# Patient Record
Sex: FEMALE | Race: WHITE | NOT HISPANIC OR LATINO | Employment: OTHER | URBAN - METROPOLITAN AREA
[De-identification: names, ages, dates, MRNs, and addresses within clinical notes are randomized per-mention and may not be internally consistent; named-entity substitution may affect disease eponyms.]

---

## 2023-05-30 RX ORDER — METHOCARBAMOL 750 MG/1
750 TABLET, FILM COATED ORAL 3 TIMES DAILY PRN
COMMUNITY
Start: 2023-03-19 | End: 2023-07-20 | Stop reason: SDUPTHER

## 2023-05-30 RX ORDER — OMEPRAZOLE 40 MG/1
40 CAPSULE, DELAYED RELEASE ORAL DAILY
COMMUNITY
Start: 2022-12-07 | End: 2023-07-20 | Stop reason: ALTCHOICE

## 2023-05-30 RX ORDER — TRAMADOL HYDROCHLORIDE 50 MG/1
50 TABLET ORAL EVERY 6 HOURS PRN
COMMUNITY
End: 2023-07-20 | Stop reason: SDUPTHER

## 2023-05-30 RX ORDER — TRAMADOL HYDROCHLORIDE 50 MG/1
50 TABLET ORAL EVERY 6 HOURS PRN
Qty: 10 TABLET | OUTPATIENT
Start: 2023-05-30

## 2023-05-30 RX ORDER — VALACYCLOVIR HYDROCHLORIDE 500 MG/1
500 TABLET, FILM COATED ORAL DAILY
COMMUNITY
Start: 2023-03-02 | End: 2023-07-20 | Stop reason: SDUPTHER

## 2023-05-30 RX ORDER — SERTRALINE HYDROCHLORIDE 50 MG/1
50 TABLET, FILM COATED ORAL DAILY
COMMUNITY
End: 2023-07-20 | Stop reason: SDUPTHER

## 2023-05-30 NOTE — TELEPHONE ENCOUNTER
Medication refused due to failing protocol.    Requested Prescriptions   Pending Prescriptions Disp Refills    traMADol (Ultram) 50 mg tablet 10 tablet      Sig: Take 1 tablet (50 mg) by mouth every 6 hours if needed.       Opioid Analgesics Protocol Failed - 5/30/2023  7:45 AM        Failed - Visit with relevant provider in past 3 months     Recent Visits  No visits were found meeting these conditions.  Showing recent visits within past 90 days and meeting all other requirements  Future Appointments  No visits were found meeting these conditions.  Showing future appointments within next 0 days and meeting all other requirements              Failed - No matching opioid in the past 29 days        Failed - Opioid pain agreement on file in past year        Failed - Urine or saliva toxicology result on file in past 12 months        Passed - No active pregnancy on record        Passed - No pregnancy test in the past 12 months or most recent test was negative        Passed - No Benzodiazepines on active med list       Controlled Substance Protocol Failed - 5/30/2023  7:45 AM        Failed - Urine or saliva toxicology result on file in past 12 months        Failed - Medication not refilled in  past 29 days        Failed - Visit with relevant provider in past 12 months.        Passed - No active pregnancy on record        Passed - No pregnancy test in the past 12 months or most recent test was negative         Signed Prescriptions Disp Refills    traMADol (Ultram) 50 mg tablet       Sig: Take 1 tablet (50 mg) by mouth every 6 hours if needed.       There is no refill protocol information for this order       valACYclovir (Valtrex) 500 mg tablet       Sig: Take 1 tablet (500 mg) by mouth once daily.       There is no refill protocol information for this order       sertraline (Zoloft) 50 mg tablet       Sig: Take 1 tablet (50 mg) by mouth once daily.       There is no refill protocol information for this order        omeprazole (PriLOSEC) 40 mg DR capsule       Sig: Take 1 capsule (40 mg) by mouth once daily.       There is no refill protocol information for this order       methocarbamol (Robaxin) 750 mg tablet       Sig: Take 1 tablet (750 mg) by mouth 3 times a day as needed.       There is no refill protocol information for this order

## 2023-06-20 LAB
ALANINE AMINOTRANSFERASE (SGPT) (U/L) IN SER/PLAS: 12 U/L (ref 7–45)
ASPARTATE AMINOTRANSFERASE (SGOT) (U/L) IN SER/PLAS: 16 U/L (ref 9–39)
CHOLESTEROL (MG/DL) IN SER/PLAS: 236 MG/DL (ref 0–199)
CHOLESTEROL IN HDL (MG/DL) IN SER/PLAS: 74.3 MG/DL
CHOLESTEROL/HDL RATIO: 3.2
LDL: 146 MG/DL (ref 0–99)
TRIGLYCERIDE (MG/DL) IN SER/PLAS: 77 MG/DL (ref 0–149)
VLDL: 15 MG/DL (ref 0–40)

## 2023-07-18 ENCOUNTER — APPOINTMENT (OUTPATIENT)
Dept: PRIMARY CARE | Facility: CLINIC | Age: 68
End: 2023-07-18
Payer: MEDICARE

## 2023-07-19 NOTE — PROGRESS NOTES
Subjective   Patient ID: Ambar Marsh is a 67 y.o. female who presents for Annual Exam (Medicare wellness check up) and Med Refill.  Med Refill  Associated symptoms include arthralgias, myalgias and neck pain.     Co pain  in the legs and back  sciatica  radiation.   Tramdol helps.   She wants to see pain management    Review lab.   Below is the patient's most recent value for Albumin, ALT, AST, BUN, Calcium, Chloride, Cholesterol, CO2, Creatinine, GFR, Glucose, HDL, Hematocrit, Hemoglobin, Hemoglobin A1C, LDL, Magnesium, Phosphorus, Platelets, Potassium, PSA, Sodium, Triglycerides, and WBC.   Lab Results   Component Value Date    ALBUMIN 4.9 11/17/2022    ALT 12 06/20/2023    AST 16 06/20/2023    BUN 18 11/17/2022    CALCIUM 9.9 11/17/2022     11/17/2022    CHOL 236 (H) 06/20/2023    CO2 29 11/17/2022    CREATININE 0.69 11/17/2022    HDL 74.3 06/20/2023    HCT 42.2 11/17/2022    HGB 13.9 11/17/2022    HGBA1C 5.6 08/25/2020     11/17/2022    K 4.6 11/17/2022     11/17/2022    TRIG 77 06/20/2023    WBC 11.0 11/17/2022     Note: for a comprehensive list of the patient's lab results, access the Results Review activity.    Review of Systems   Constitutional: Negative.    HENT: Negative.     Eyes: Negative.    Respiratory: Negative.     Cardiovascular: Negative.    Gastrointestinal: Negative.    Endocrine: Negative.    Musculoskeletal:  Positive for arthralgias, myalgias and neck pain.   Skin: Negative.    Neurological: Negative.    Hematological: Negative.    Psychiatric/Behavioral: Negative.     All other systems reviewed and are negative.      Objective   Physical Exam  Vitals and nursing note reviewed. Exam conducted with a chaperone present.   Constitutional:       Appearance: Normal appearance.   HENT:      Head: Normocephalic and atraumatic.      Right Ear: Tympanic membrane, ear canal and external ear normal.      Left Ear: Tympanic membrane, ear canal and external ear normal.      Nose:  Nose normal.      Mouth/Throat:      Mouth: Mucous membranes are moist.      Pharynx: Oropharynx is clear.   Eyes:      Extraocular Movements: Extraocular movements intact.      Conjunctiva/sclera: Conjunctivae normal.      Pupils: Pupils are equal, round, and reactive to light.   Cardiovascular:      Rate and Rhythm: Normal rate and regular rhythm.      Pulses: Normal pulses.      Heart sounds: Normal heart sounds.   Pulmonary:      Effort: Pulmonary effort is normal.      Breath sounds: Normal breath sounds.   Musculoskeletal:         General: Tenderness present. Normal range of motion.      Cervical back: Neck supple.   Skin:     General: Skin is warm and dry.      Capillary Refill: Capillary refill takes less than 2 seconds.   Neurological:      General: No focal deficit present.      Mental Status: She is alert and oriented to person, place, and time. Mental status is at baseline.   Psychiatric:         Mood and Affect: Mood normal.         Behavior: Behavior normal.         Thought Content: Thought content normal.         Judgment: Judgment normal.         Assessment/Plan   Problem List Items Addressed This Visit          Medium    Chronic bronchitis, unspecified chronic bronchitis type (CMS/HCC)     Other Visit Diagnoses       Healthcare maintenance    -  Primary    Relevant Medications    sertraline (Zoloft) 50 mg tablet    valACYclovir (Valtrex) 500 mg tablet    Other Relevant Orders    CBC    Comprehensive Metabolic Panel    TSH with reflex to Free T4 if abnormal    Encounter for screening mammogram for breast cancer        Screening for colorectal cancer        Routine general medical examination at health care facility        Encounter for screening mammogram for malignant neoplasm of breast        Benign essential HTN        Relevant Orders    CBC    Comprehensive Metabolic Panel    TSH with reflex to Free T4 if abnormal    Hyperlipidemia, unspecified hyperlipidemia type        Sciatic leg pain         Relevant Medications    methocarbamol (Robaxin) 750 mg tablet    traMADol (Ultram) 50 mg tablet    Other Relevant Orders    Referral to Pain Medicine                 OARRS:  Bernie Anne MD on 7/20/2023  3:13 PM  I have personally reviewed the OARRS report for Ambar Marsh. I have considered the risks of abuse, dependence, addiction and diversion    Is the patient prescribed a combination of a benzodiazepine and opioid?  No    Last Urine Drug Screen / ordered today: no  Recent Results (from the past 23239 hour(s))   OPIATE/OPIOID/BENZO PRESCRIPTION COMPLIANCE    Collection Time: 11/17/22  3:29 PM   Result Value Ref Range    DRUG SCREEN COMMENT URINE SEE BELOW     Creatine, Urine 174.8 mg/dL    Amphetamine Screen, Urine PRESUMPTIVE NEGATIVE NEGATIVE    Barbiturate Screen, Urine PRESUMPTIVE NEGATIVE NEGATIVE    Cannabinoid Screen, Urine PRESUMPTIVE NEGATIVE NEGATIVE    Cocaine Screen, Urine PRESUMPTIVE NEGATIVE NEGATIVE    PCP Screen, Urine PRESUMPTIVE NEGATIVE NEGATIVE    7-Aminoclonazepam <25 Cutoff <25 ng/mL    Alpha-Hydroxyalprazolam <25 Cutoff <25 ng/mL    Alpha-Hydroxymidazolam <25 Cutoff <25 ng/mL    Alprazolam <25 Cutoff <25 ng/mL    Chlordiazepoxide <25 Cutoff <25 ng/mL    Clonazepam <25 Cutoff <25 ng/mL    Diazepam <25 Cutoff <25 ng/mL    Lorazepam <25 Cutoff <25 ng/mL    Midazolam <25 Cutoff <25 ng/mL    Nordiazepam <25 Cutoff <25 ng/mL    Oxazepam <25 Cutoff <25 ng/mL    Temazepam <25 Cutoff <25 ng/mL    Zolpidem <25 Cutoff <25 ng/mL    Zolpidem Metabolite (ZCA) <25 Cutoff <25 ng/mL    6-Acetylmorphine <25 Cutoff <25 ng/mL    Codeine <50 Cutoff <50 ng/mL    Hydrocodone <25 Cutoff <25 ng/mL    Hydromorphone <25 Cutoff <25 ng/mL    Morphine Urine <50 Cutoff <50 ng/mL    Norhydrocodone <25 Cutoff <25 ng/mL    Noroxycodone <25 Cutoff <25 ng/mL    Oxycodone <25 Cutoff <25 ng/mL    Oxymorphone <25 Cutoff <25 ng/mL    Tramadol >1000 (A) Cutoff <50 ng/mL    O-Desmethyltramadol >1000 (A) Cutoff <50 ng/mL     Fentanyl <2.5 Cutoff<2.5 ng/mL    Norfentanyl <2.5 Cutoff<2.5 ng/mL    METHADONE CONFIRMATION,URINE <25 Cutoff <25 ng/mL    EDDP <25 Cutoff <25 ng/mL     Results are as expected.     Controlled Substance Agreement:  Date of the Last Agreement: 11/8/ 22  Reviewed Controlled Substance Agreement including but not limited to the benefits, risks, and alternatives to treatment with a Controlled Substance medication(s).    Opioids:  What is the patient's goal of therapy? Keep pain  4 or  below / 10   Is this being achieved with current treatment?y Es      I have calculated the patient's Morphine Dose Equivalent (MED):   I have considered referral to Pain Management and/or a specialist, and do not feel it is necessary at this time.    I feel that it is clinically indicated to continue this current medication regimen after consideration of alternative therapies, and other non-opioid treatment.    Opioid Risk Screening:  No data recorded    Pain Assessment:  No data recorded

## 2023-07-20 ENCOUNTER — OFFICE VISIT (OUTPATIENT)
Dept: PRIMARY CARE | Facility: CLINIC | Age: 68
End: 2023-07-20
Payer: MEDICARE

## 2023-07-20 VITALS
OXYGEN SATURATION: 94 % | SYSTOLIC BLOOD PRESSURE: 129 MMHG | WEIGHT: 136.6 LBS | DIASTOLIC BLOOD PRESSURE: 73 MMHG | BODY MASS INDEX: 23.32 KG/M2 | HEIGHT: 64 IN | HEART RATE: 64 BPM

## 2023-07-20 DIAGNOSIS — Z12.31 ENCOUNTER FOR SCREENING MAMMOGRAM FOR BREAST CANCER: ICD-10-CM

## 2023-07-20 DIAGNOSIS — Z00.00 HEALTHCARE MAINTENANCE: Primary | ICD-10-CM

## 2023-07-20 DIAGNOSIS — Z00.00 ROUTINE GENERAL MEDICAL EXAMINATION AT HEALTH CARE FACILITY: ICD-10-CM

## 2023-07-20 DIAGNOSIS — J42 CHRONIC BRONCHITIS, UNSPECIFIED CHRONIC BRONCHITIS TYPE (MULTI): ICD-10-CM

## 2023-07-20 DIAGNOSIS — I10 BENIGN ESSENTIAL HTN: ICD-10-CM

## 2023-07-20 DIAGNOSIS — Z12.11 SCREENING FOR COLORECTAL CANCER: ICD-10-CM

## 2023-07-20 DIAGNOSIS — M54.30 SCIATIC LEG PAIN: ICD-10-CM

## 2023-07-20 DIAGNOSIS — Z12.12 SCREENING FOR COLORECTAL CANCER: ICD-10-CM

## 2023-07-20 DIAGNOSIS — Z12.31 ENCOUNTER FOR SCREENING MAMMOGRAM FOR MALIGNANT NEOPLASM OF BREAST: ICD-10-CM

## 2023-07-20 DIAGNOSIS — E78.5 HYPERLIPIDEMIA, UNSPECIFIED HYPERLIPIDEMIA TYPE: ICD-10-CM

## 2023-07-20 PROCEDURE — 1036F TOBACCO NON-USER: CPT | Performed by: INTERNAL MEDICINE

## 2023-07-20 PROCEDURE — 1126F AMNT PAIN NOTED NONE PRSNT: CPT | Performed by: INTERNAL MEDICINE

## 2023-07-20 PROCEDURE — 3074F SYST BP LT 130 MM HG: CPT | Performed by: INTERNAL MEDICINE

## 2023-07-20 PROCEDURE — 3078F DIAST BP <80 MM HG: CPT | Performed by: INTERNAL MEDICINE

## 2023-07-20 PROCEDURE — 99397 PER PM REEVAL EST PAT 65+ YR: CPT | Performed by: INTERNAL MEDICINE

## 2023-07-20 PROCEDURE — 1170F FXNL STATUS ASSESSED: CPT | Performed by: INTERNAL MEDICINE

## 2023-07-20 PROCEDURE — 1159F MED LIST DOCD IN RCRD: CPT | Performed by: INTERNAL MEDICINE

## 2023-07-20 PROCEDURE — 1160F RVW MEDS BY RX/DR IN RCRD: CPT | Performed by: INTERNAL MEDICINE

## 2023-07-20 PROCEDURE — G0439 PPPS, SUBSEQ VISIT: HCPCS | Performed by: INTERNAL MEDICINE

## 2023-07-20 RX ORDER — TRAMADOL HYDROCHLORIDE 50 MG/1
50 TABLET ORAL EVERY 6 HOURS PRN
Qty: 120 TABLET | Refills: 2 | Status: SHIPPED | OUTPATIENT
Start: 2023-07-20 | End: 2023-10-24 | Stop reason: SDUPTHER

## 2023-07-20 RX ORDER — METHOCARBAMOL 750 MG/1
750 TABLET, FILM COATED ORAL 3 TIMES DAILY PRN
Qty: 90 TABLET | Refills: 5 | Status: SHIPPED | OUTPATIENT
Start: 2023-07-20 | End: 2024-04-08 | Stop reason: SDUPTHER

## 2023-07-20 RX ORDER — VALACYCLOVIR HYDROCHLORIDE 500 MG/1
500 TABLET, FILM COATED ORAL DAILY
Qty: 90 TABLET | Refills: 3 | Status: SHIPPED | OUTPATIENT
Start: 2023-07-20 | End: 2024-04-08 | Stop reason: SDUPTHER

## 2023-07-20 RX ORDER — SERTRALINE HYDROCHLORIDE 50 MG/1
50 TABLET, FILM COATED ORAL DAILY
Qty: 90 TABLET | Refills: 3 | Status: SHIPPED | OUTPATIENT
Start: 2023-07-20 | End: 2024-01-23 | Stop reason: SDUPTHER

## 2023-07-20 ASSESSMENT — ENCOUNTER SYMPTOMS
RESPIRATORY NEGATIVE: 1
EYES NEGATIVE: 1
CONSTITUTIONAL NEGATIVE: 1
HEMATOLOGIC/LYMPHATIC NEGATIVE: 1
ARTHRALGIAS: 1
PSYCHIATRIC NEGATIVE: 1
LOSS OF SENSATION IN FEET: 0
OCCASIONAL FEELINGS OF UNSTEADINESS: 0
GASTROINTESTINAL NEGATIVE: 1
NECK PAIN: 1
CARDIOVASCULAR NEGATIVE: 1
NEUROLOGICAL NEGATIVE: 1
ENDOCRINE NEGATIVE: 1
MYALGIAS: 1
DEPRESSION: 0

## 2023-07-20 ASSESSMENT — LIFESTYLE VARIABLES
AUDIT-C TOTAL SCORE: 2
HOW OFTEN DO YOU HAVE SIX OR MORE DRINKS ON ONE OCCASION: LESS THAN MONTHLY
SKIP TO QUESTIONS 9-10: 0
HOW MANY STANDARD DRINKS CONTAINING ALCOHOL DO YOU HAVE ON A TYPICAL DAY: 1 OR 2
HOW OFTEN DO YOU HAVE A DRINK CONTAINING ALCOHOL: MONTHLY OR LESS

## 2023-07-20 ASSESSMENT — PATIENT HEALTH QUESTIONNAIRE - PHQ9
1. LITTLE INTEREST OR PLEASURE IN DOING THINGS: NOT AT ALL
2. FEELING DOWN, DEPRESSED OR HOPELESS: NOT AT ALL
SUM OF ALL RESPONSES TO PHQ9 QUESTIONS 1 AND 2: 0

## 2023-07-20 ASSESSMENT — ACTIVITIES OF DAILY LIVING (ADL)
DRESSING: INDEPENDENT
GROCERY_SHOPPING: INDEPENDENT
MANAGING_FINANCES: INDEPENDENT
DOING_HOUSEWORK: INDEPENDENT
BATHING: INDEPENDENT
TAKING_MEDICATION: INDEPENDENT

## 2023-07-20 ASSESSMENT — COLUMBIA-SUICIDE SEVERITY RATING SCALE - C-SSRS
6. HAVE YOU EVER DONE ANYTHING, STARTED TO DO ANYTHING, OR PREPARED TO DO ANYTHING TO END YOUR LIFE?: NO
2. HAVE YOU ACTUALLY HAD ANY THOUGHTS OF KILLING YOURSELF?: NO
1. IN THE PAST MONTH, HAVE YOU WISHED YOU WERE DEAD OR WISHED YOU COULD GO TO SLEEP AND NOT WAKE UP?: NO

## 2023-07-20 NOTE — PROGRESS NOTES
"Subjective   Patient ID: Ambar Marsh is a 67 y.o. female who presents for Annual Exam (Medicare wellness check up) and Med Refill.    HPI     Review of Systems    Objective   /73   Pulse 64   Ht 1.626 m (5' 4\")   Wt 62 kg (136 lb 9.6 oz)   SpO2 94%   BMI 23.45 kg/m²     Physical Exam    Assessment/Plan          "

## 2023-09-13 PROBLEM — E78.5 HLD (HYPERLIPIDEMIA): Status: ACTIVE | Noted: 2023-09-13

## 2023-09-13 PROBLEM — H90.3 BILATERAL SENSORINEURAL HEARING LOSS: Status: ACTIVE | Noted: 2023-09-13

## 2023-09-13 PROBLEM — M79.7 FIBROMYALGIA: Status: ACTIVE | Noted: 2023-09-13

## 2023-09-13 PROBLEM — M62.830 MUSCLE SPASM OF BACK: Status: ACTIVE | Noted: 2023-09-13

## 2023-09-13 PROBLEM — H93.13 TINNITUS OF BOTH EARS: Status: ACTIVE | Noted: 2023-09-13

## 2023-09-13 PROBLEM — M77.01 MEDIAL EPICONDYLITIS OF RIGHT ELBOW: Status: ACTIVE | Noted: 2023-09-13

## 2023-09-13 PROBLEM — M47.812 DJD (DEGENERATIVE JOINT DISEASE), CERVICAL: Status: ACTIVE | Noted: 2023-09-13

## 2023-09-13 PROBLEM — G25.0 BENIGN ESSENTIAL TREMOR: Status: ACTIVE | Noted: 2023-09-13

## 2023-09-13 PROBLEM — M54.16 CHRONIC LUMBAR RADICULOPATHY: Status: ACTIVE | Noted: 2023-09-13

## 2023-09-13 PROBLEM — F41.9 ANXIETY: Status: ACTIVE | Noted: 2023-09-13

## 2023-09-13 PROBLEM — K29.70 GASTRITIS: Status: ACTIVE | Noted: 2023-09-13

## 2023-09-13 PROBLEM — K56.600 PARTIAL OBSTRUCTION OF SMALL INTESTINE (MULTI): Status: ACTIVE | Noted: 2023-09-13

## 2023-09-13 PROBLEM — G47.00 INSOMNIA: Status: ACTIVE | Noted: 2023-09-13

## 2023-09-13 PROBLEM — M54.9 MID BACK PAIN, CHRONIC: Status: ACTIVE | Noted: 2023-09-13

## 2023-09-13 PROBLEM — M48.02 FORAMINAL STENOSIS OF CERVICAL REGION: Status: ACTIVE | Noted: 2023-09-13

## 2023-09-13 PROBLEM — M54.2 CHRONIC NECK PAIN: Status: ACTIVE | Noted: 2023-09-13

## 2023-09-13 PROBLEM — G89.29 CHRONIC NECK PAIN: Status: ACTIVE | Noted: 2023-09-13

## 2023-09-13 PROBLEM — A60.09 HERPES SIMPLEX OF FEMALE GENITALIA: Status: ACTIVE | Noted: 2023-09-13

## 2023-09-13 PROBLEM — G89.29 MID BACK PAIN, CHRONIC: Status: ACTIVE | Noted: 2023-09-13

## 2023-09-13 PROBLEM — N95.1 SWEATS, MENOPAUSAL: Status: ACTIVE | Noted: 2023-09-13

## 2023-09-13 PROBLEM — R25.1 TREMORS OF NERVOUS SYSTEM: Status: ACTIVE | Noted: 2023-09-13

## 2023-09-13 PROBLEM — M19.90 OSTEOARTHRITIS: Status: ACTIVE | Noted: 2023-09-13

## 2023-09-13 PROBLEM — R87.811 VAGINAL HIGH RISK HUMAN PAPILLOMAVIRUS (HPV) DNA TEST POSITIVE: Status: ACTIVE | Noted: 2023-09-13

## 2023-09-13 PROBLEM — M43.02 CERVICAL SPONDYLOLYSIS: Status: ACTIVE | Noted: 2023-09-13

## 2023-09-13 PROBLEM — N95.2 ATROPHIC VAGINITIS: Status: ACTIVE | Noted: 2023-09-13

## 2023-09-13 RX ORDER — MULTIVITAMIN
1 TABLET ORAL DAILY
COMMUNITY
End: 2024-01-23 | Stop reason: ALTCHOICE

## 2023-09-13 RX ORDER — CHLORHEXIDINE GLUCONATE ORAL RINSE 1.2 MG/ML
SOLUTION DENTAL
COMMUNITY
Start: 2022-11-03 | End: 2023-10-22 | Stop reason: ALTCHOICE

## 2023-09-13 RX ORDER — PROPRANOLOL HYDROCHLORIDE 60 MG/1
1 CAPSULE, EXTENDED RELEASE ORAL DAILY
COMMUNITY
Start: 2023-06-20 | End: 2024-01-23 | Stop reason: ALTCHOICE

## 2023-09-13 RX ORDER — SIMVASTATIN 20 MG/1
1 TABLET, FILM COATED ORAL NIGHTLY
COMMUNITY
Start: 2015-01-07 | End: 2024-01-23 | Stop reason: ALTCHOICE

## 2023-09-13 RX ORDER — LORAZEPAM 1 MG/1
1 TABLET ORAL NIGHTLY PRN
COMMUNITY
Start: 2015-01-07 | End: 2023-10-22 | Stop reason: ALTCHOICE

## 2023-09-13 RX ORDER — HYDROCORTISONE 25 MG/G
1 CREAM TOPICAL 2 TIMES DAILY
COMMUNITY
Start: 2015-01-23 | End: 2024-01-23 | Stop reason: ALTCHOICE

## 2023-09-26 ENCOUNTER — HOSPITAL ENCOUNTER (OUTPATIENT)
Dept: DATA CONVERSION | Facility: HOSPITAL | Age: 68
Discharge: HOME | End: 2023-09-26
Payer: MEDICARE

## 2023-09-26 DIAGNOSIS — R87.810 CERVICAL HIGH RISK HUMAN PAPILLOMAVIRUS (HPV) DNA TEST POSITIVE: ICD-10-CM

## 2023-10-22 NOTE — PROGRESS NOTES
"Fu refill tramadol .  Needs  contract for  tramadol.   Needs  drug screen    Subjective   Patient ID: Ambar Marsh is a 68 y.o. female who presents for Follow-up (3 MO FUV, RF ).  HPI  Refill tramadol    Review of Systems   Constitutional: Negative.    All other systems reviewed and are negative.      Objective   BP Readings from Last 3 Encounters:   10/23/23 114/68   09/26/23 138/78   07/20/23 129/73      Wt Readings from Last 3 Encounters:   10/23/23 61.2 kg (135 lb)   09/26/23 62 kg (136 lb 9.6 oz)   07/20/23 62 kg (136 lb 9.6 oz)      BMI: Estimated body mass index is 23.91 kg/m² as calculated from the following:    Height as of this encounter: 1.6 m (5' 3\").    Weight as of this encounter: 61.2 kg (135 lb).    BSA: Estimated body surface area is 1.65 meters squared as calculated from the following:    Height as of this encounter: 1.6 m (5' 3\").    Weight as of this encounter: 61.2 kg (135 lb).  Physical Exam  Vitals and nursing note reviewed.   Constitutional:       Appearance: Normal appearance.   HENT:      Head: Normocephalic and atraumatic.      Nose: Nose normal.   Eyes:      Conjunctiva/sclera: Conjunctivae normal.   Pulmonary:      Effort: Pulmonary effort is normal.   Skin:     General: Skin is dry.   Neurological:      General: No focal deficit present.      Mental Status: She is alert and oriented to person, place, and time. Mental status is at baseline.   Psychiatric:         Mood and Affect: Mood normal.         Behavior: Behavior normal.         Assessment/Plan     Fm   Chronic  neck and back pain.      Controlled on  currenet  med.     Refill tramadol.      Fu feb           OARRS:  Bernie Anne MD on 10/22/2023  4:55 PM  I have personally reviewed the OARRS report for Ambar Marsh. I have considered the risks of abuse, dependence, addiction and diversion    Is the patient prescribed a combination of a benzodiazepine and opioid?  No    Last Urine Drug Screen / ordered today: Yes  Recent " Results (from the past 8760 hour(s))   OPIATE/OPIOID/BENZO PRESCRIPTION COMPLIANCE    Collection Time: 11/17/22  3:29 PM   Result Value Ref Range    DRUG SCREEN COMMENT URINE SEE BELOW     Creatine, Urine 174.8 mg/dL    Amphetamine Screen, Urine PRESUMPTIVE NEGATIVE NEGATIVE    Barbiturate Screen, Urine PRESUMPTIVE NEGATIVE NEGATIVE    Cannabinoid Screen, Urine PRESUMPTIVE NEGATIVE NEGATIVE    Cocaine Screen, Urine PRESUMPTIVE NEGATIVE NEGATIVE    PCP Screen, Urine PRESUMPTIVE NEGATIVE NEGATIVE    7-Aminoclonazepam <25 Cutoff <25 ng/mL    Alpha-Hydroxyalprazolam <25 Cutoff <25 ng/mL    Alpha-Hydroxymidazolam <25 Cutoff <25 ng/mL    Alprazolam <25 Cutoff <25 ng/mL    Chlordiazepoxide <25 Cutoff <25 ng/mL    Clonazepam <25 Cutoff <25 ng/mL    Diazepam <25 Cutoff <25 ng/mL    Lorazepam <25 Cutoff <25 ng/mL    Midazolam <25 Cutoff <25 ng/mL    Nordiazepam <25 Cutoff <25 ng/mL    Oxazepam <25 Cutoff <25 ng/mL    Temazepam <25 Cutoff <25 ng/mL    Zolpidem <25 Cutoff <25 ng/mL    Zolpidem Metabolite (ZCA) <25 Cutoff <25 ng/mL    6-Acetylmorphine <25 Cutoff <25 ng/mL    Codeine <50 Cutoff <50 ng/mL    Hydrocodone <25 Cutoff <25 ng/mL    Hydromorphone <25 Cutoff <25 ng/mL    Morphine Urine <50 Cutoff <50 ng/mL    Norhydrocodone <25 Cutoff <25 ng/mL    Noroxycodone <25 Cutoff <25 ng/mL    Oxycodone <25 Cutoff <25 ng/mL    Oxymorphone <25 Cutoff <25 ng/mL    Tramadol >1000 (A) Cutoff <50 ng/mL    O-Desmethyltramadol >1000 (A) Cutoff <50 ng/mL    Fentanyl <2.5 Cutoff<2.5 ng/mL    Norfentanyl <2.5 Cutoff<2.5 ng/mL    METHADONE CONFIRMATION,URINE <25 Cutoff <25 ng/mL    EDDP <25 Cutoff <25 ng/mL     Results are as expected.         Controlled Substance Agreement:  Date of the Last Agreement: 10 23  23   Reviewed Controlled Substance Agreement including but not limited to the benefits, risks, and alternatives to treatment with a Controlled Substance medication(s).    Opioids:  What is the patient's goal of therapy? Keep pain   under 5  Is this being achieved with current treatment? yes    I have calculated the patient's Morphine Dose Equivalent (MED):   I have considered referral to Pain Management and/or a specialist, and do not feel it is necessary at this time.    I feel that it is clinically indicated to continue this current medication regimen after consideration of alternative therapies, and other non-opioid treatment.    Opioid Risk Screenin  mod  Family History of Substance Abuse  Alcohol: 0 (10/23/2023 10:00 AM)  Illegal Drugs: 0 (10/23/2023 10:00 AM)  Prescription Drugs: 0 (10/23/2023 10:00 AM)    Personal History of Substance Abuse  Alcohol: 0 (10/23/2023 10:00 AM)  Illegal Drugs: 4 (10/23/2023 10:00 AM)  Prescription Drugs: 0 (10/23/2023 10:00 AM)    Patient Age (16-45)  Age (16-45): 0 (10/23/2023 10:00 AM)    History of Preadolescent Sexual Abuse  History of Preadolescent Sexual Abuse: .0 (10/23/2023 10:00 AM)    Psychological Disease  Attention Deficit Disorder, Obsessive Compulsive Disorder, Bipolar, Schizophrenia: 0 (10/23/2023 10:00 AM)  Depression: 0 (10/23/2023 10:00 AM)    Total Score  Total Score: 4 (10/23/2023 10:00 AM)    Total Score Risk Category  TOTAL SCORE CATEGORY: Moderate Risk (4-7) (10/23/2023 10:00 AM)        Pain Assessment:  3/10  No data recorded

## 2023-10-23 ENCOUNTER — OFFICE VISIT (OUTPATIENT)
Dept: PRIMARY CARE | Facility: CLINIC | Age: 68
End: 2023-10-23
Payer: MEDICARE

## 2023-10-23 VITALS
DIASTOLIC BLOOD PRESSURE: 68 MMHG | WEIGHT: 135 LBS | HEART RATE: 64 BPM | OXYGEN SATURATION: 95 % | SYSTOLIC BLOOD PRESSURE: 114 MMHG | BODY MASS INDEX: 23.92 KG/M2 | HEIGHT: 63 IN

## 2023-10-23 DIAGNOSIS — Z79.899 MEDICATION MANAGEMENT: ICD-10-CM

## 2023-10-23 DIAGNOSIS — M54.9 MID BACK PAIN, CHRONIC: Primary | ICD-10-CM

## 2023-10-23 DIAGNOSIS — M77.01 MEDIAL EPICONDYLITIS OF RIGHT ELBOW: ICD-10-CM

## 2023-10-23 DIAGNOSIS — Z87.891 SMOKING HISTORY: ICD-10-CM

## 2023-10-23 DIAGNOSIS — M79.7 FIBROMYALGIA: ICD-10-CM

## 2023-10-23 DIAGNOSIS — G89.29 MID BACK PAIN, CHRONIC: Primary | ICD-10-CM

## 2023-10-23 PROCEDURE — 1126F AMNT PAIN NOTED NONE PRSNT: CPT | Performed by: INTERNAL MEDICINE

## 2023-10-23 PROCEDURE — 1160F RVW MEDS BY RX/DR IN RCRD: CPT | Performed by: INTERNAL MEDICINE

## 2023-10-23 PROCEDURE — 1036F TOBACCO NON-USER: CPT | Performed by: INTERNAL MEDICINE

## 2023-10-23 PROCEDURE — 99214 OFFICE O/P EST MOD 30 MIN: CPT | Performed by: INTERNAL MEDICINE

## 2023-10-23 PROCEDURE — 1159F MED LIST DOCD IN RCRD: CPT | Performed by: INTERNAL MEDICINE

## 2023-10-23 ASSESSMENT — PATIENT HEALTH QUESTIONNAIRE - PHQ9
2. FEELING DOWN, DEPRESSED OR HOPELESS: NOT AT ALL
1. LITTLE INTEREST OR PLEASURE IN DOING THINGS: NOT AT ALL
SUM OF ALL RESPONSES TO PHQ9 QUESTIONS 1 AND 2: 0

## 2023-10-23 ASSESSMENT — ENCOUNTER SYMPTOMS
DEPRESSION: 0
CONSTITUTIONAL NEGATIVE: 1

## 2023-10-23 ASSESSMENT — LIFESTYLE VARIABLES: TOTAL SCORE: 4

## 2023-10-24 ENCOUNTER — TELEPHONE (OUTPATIENT)
Dept: PRIMARY CARE | Facility: CLINIC | Age: 68
End: 2023-10-24
Payer: MEDICARE

## 2023-10-24 DIAGNOSIS — M54.30 SCIATIC LEG PAIN: Primary | ICD-10-CM

## 2023-10-24 RX ORDER — TRAMADOL HYDROCHLORIDE 50 MG/1
50 TABLET ORAL EVERY 6 HOURS PRN
Qty: 120 TABLET | Refills: 0 | Status: SHIPPED | OUTPATIENT
Start: 2023-10-24 | End: 2023-11-20 | Stop reason: SDUPTHER

## 2023-11-14 ENCOUNTER — LAB (OUTPATIENT)
Dept: LAB | Facility: LAB | Age: 68
End: 2023-11-14
Payer: MEDICARE

## 2023-11-14 DIAGNOSIS — Z79.899 MEDICATION MANAGEMENT: ICD-10-CM

## 2023-11-14 LAB
AMPHETAMINES UR QL SCN: NORMAL
BARBITURATES UR QL SCN: NORMAL
BENZODIAZ UR QL SCN: NORMAL
BZE UR QL SCN: NORMAL
CANNABINOIDS UR QL SCN: NORMAL
FENTANYL+NORFENTANYL UR QL SCN: NORMAL
OPIATES UR QL SCN: NORMAL
OXYCODONE+OXYMORPHONE UR QL SCN: NORMAL
PCP UR QL SCN: NORMAL

## 2023-11-14 PROCEDURE — 80307 DRUG TEST PRSMV CHEM ANLYZR: CPT

## 2023-11-20 DIAGNOSIS — M54.30 SCIATIC LEG PAIN: ICD-10-CM

## 2023-11-20 RX ORDER — TRAMADOL HYDROCHLORIDE 50 MG/1
TABLET ORAL
Qty: 120 TABLET | Refills: 0 | OUTPATIENT
Start: 2023-11-20

## 2023-11-20 RX ORDER — TRAMADOL HYDROCHLORIDE 50 MG/1
50 TABLET ORAL EVERY 6 HOURS PRN
Qty: 120 TABLET | Refills: 0 | Status: SHIPPED | OUTPATIENT
Start: 2023-11-20 | End: 2023-11-25

## 2023-11-21 ENCOUNTER — ANCILLARY PROCEDURE (OUTPATIENT)
Dept: RADIOLOGY | Facility: CLINIC | Age: 68
End: 2023-11-21
Payer: MEDICARE

## 2023-11-21 DIAGNOSIS — Z87.891 SMOKING HISTORY: ICD-10-CM

## 2023-11-21 PROCEDURE — 71271 CT THORAX LUNG CANCER SCR C-: CPT

## 2023-11-21 PROCEDURE — 71271 CT THORAX LUNG CANCER SCR C-: CPT | Performed by: RADIOLOGY

## 2023-11-25 DIAGNOSIS — M54.30 SCIATIC LEG PAIN: ICD-10-CM

## 2023-11-25 RX ORDER — TRAMADOL HYDROCHLORIDE 50 MG/1
50 TABLET ORAL EVERY 6 HOURS PRN
Qty: 120 TABLET | Refills: 0 | Status: SHIPPED | OUTPATIENT
Start: 2023-11-25 | End: 2024-01-06

## 2023-11-27 NOTE — TELEPHONE ENCOUNTER
LVM FOR PT WITH RESULTS OF LUNG SCREENING, NO WORRISOME NODULE AND TO REPEAT ONCE A YEAR. PT ALSO SEEN RESULTS VIA MY CHART

## 2024-01-06 DIAGNOSIS — M54.30 SCIATIC LEG PAIN: ICD-10-CM

## 2024-01-06 RX ORDER — TRAMADOL HYDROCHLORIDE 50 MG/1
TABLET ORAL
Qty: 120 TABLET | Refills: 0 | Status: SHIPPED | OUTPATIENT
Start: 2024-01-06 | End: 2024-03-06 | Stop reason: SDUPTHER

## 2024-01-23 DIAGNOSIS — Z00.00 HEALTHCARE MAINTENANCE: ICD-10-CM

## 2024-01-23 RX ORDER — SERTRALINE HYDROCHLORIDE 50 MG/1
50 TABLET, FILM COATED ORAL DAILY
Qty: 90 TABLET | Refills: 0 | Status: SHIPPED | OUTPATIENT
Start: 2024-01-23 | End: 2024-03-06 | Stop reason: SDUPTHER

## 2024-02-15 ENCOUNTER — APPOINTMENT (OUTPATIENT)
Dept: PRIMARY CARE | Facility: CLINIC | Age: 69
End: 2024-02-15
Payer: MEDICARE

## 2024-02-28 ENCOUNTER — APPOINTMENT (OUTPATIENT)
Dept: PRIMARY CARE | Facility: CLINIC | Age: 69
End: 2024-02-28
Payer: MEDICARE

## 2024-03-06 DIAGNOSIS — Z00.00 HEALTHCARE MAINTENANCE: ICD-10-CM

## 2024-03-06 DIAGNOSIS — M54.30 SCIATIC LEG PAIN: ICD-10-CM

## 2024-03-06 RX ORDER — TRAMADOL HYDROCHLORIDE 50 MG/1
50 TABLET ORAL EVERY 6 HOURS PRN
Qty: 120 TABLET | Refills: 0 | Status: SHIPPED | OUTPATIENT
Start: 2024-03-06 | End: 2024-04-08 | Stop reason: SDUPTHER

## 2024-03-06 RX ORDER — SERTRALINE HYDROCHLORIDE 50 MG/1
50 TABLET, FILM COATED ORAL DAILY
Qty: 90 TABLET | Refills: 0 | Status: SHIPPED | OUTPATIENT
Start: 2024-03-06 | End: 2024-04-08 | Stop reason: SDUPTHER

## 2024-04-08 ENCOUNTER — LAB (OUTPATIENT)
Dept: LAB | Facility: LAB | Age: 69
End: 2024-04-08
Payer: MEDICARE

## 2024-04-08 ENCOUNTER — OFFICE VISIT (OUTPATIENT)
Dept: PRIMARY CARE | Facility: CLINIC | Age: 69
End: 2024-04-08
Payer: MEDICARE

## 2024-04-08 VITALS
WEIGHT: 135 LBS | HEIGHT: 63 IN | SYSTOLIC BLOOD PRESSURE: 129 MMHG | BODY MASS INDEX: 23.92 KG/M2 | DIASTOLIC BLOOD PRESSURE: 77 MMHG | HEART RATE: 59 BPM | OXYGEN SATURATION: 97 %

## 2024-04-08 DIAGNOSIS — M54.16 CHRONIC LUMBAR RADICULOPATHY: ICD-10-CM

## 2024-04-08 DIAGNOSIS — Z13.6 SCREENING FOR CARDIOVASCULAR CONDITION: ICD-10-CM

## 2024-04-08 DIAGNOSIS — Z12.31 ENCOUNTER FOR SCREENING MAMMOGRAM FOR BREAST CANCER: ICD-10-CM

## 2024-04-08 DIAGNOSIS — A60.09 HERPES SIMPLEX OF FEMALE GENITALIA: ICD-10-CM

## 2024-04-08 DIAGNOSIS — Z13.6 SCREENING FOR AAA (ABDOMINAL AORTIC ANEURYSM): ICD-10-CM

## 2024-04-08 DIAGNOSIS — F41.9 ANXIETY: ICD-10-CM

## 2024-04-08 DIAGNOSIS — M54.30 SCIATIC LEG PAIN: ICD-10-CM

## 2024-04-08 DIAGNOSIS — Z78.0 ASYMPTOMATIC MENOPAUSAL STATE: ICD-10-CM

## 2024-04-08 DIAGNOSIS — Z00.00 HEALTHCARE MAINTENANCE: ICD-10-CM

## 2024-04-08 DIAGNOSIS — M85.80 OSTEOPENIA, UNSPECIFIED LOCATION: ICD-10-CM

## 2024-04-08 DIAGNOSIS — M62.830 MUSCLE SPASM OF BACK: ICD-10-CM

## 2024-04-08 DIAGNOSIS — E78.49 OTHER HYPERLIPIDEMIA: ICD-10-CM

## 2024-04-08 DIAGNOSIS — Z00.00 ROUTINE GENERAL MEDICAL EXAMINATION AT HEALTH CARE FACILITY: Primary | ICD-10-CM

## 2024-04-08 LAB
25(OH)D3 SERPL-MCNC: 74 NG/ML (ref 30–100)
ALBUMIN SERPL BCP-MCNC: 4.7 G/DL (ref 3.4–5)
ALP SERPL-CCNC: 73 U/L (ref 33–136)
ALT SERPL W P-5'-P-CCNC: 13 U/L (ref 7–45)
ANION GAP SERPL CALC-SCNC: 15 MMOL/L (ref 10–20)
APPEARANCE UR: CLEAR
AST SERPL W P-5'-P-CCNC: 15 U/L (ref 9–39)
BASOPHILS # BLD AUTO: 0.1 X10*3/UL (ref 0–0.1)
BASOPHILS NFR BLD AUTO: 1 %
BILIRUB SERPL-MCNC: 0.4 MG/DL (ref 0–1.2)
BILIRUB UR STRIP.AUTO-MCNC: NEGATIVE MG/DL
BUN SERPL-MCNC: 17 MG/DL (ref 6–23)
CALCIUM SERPL-MCNC: 10.1 MG/DL (ref 8.6–10.6)
CHLORIDE SERPL-SCNC: 101 MMOL/L (ref 98–107)
CHOLEST SERPL-MCNC: 244 MG/DL (ref 0–199)
CHOLESTEROL/HDL RATIO: 3.1
CO2 SERPL-SCNC: 29 MMOL/L (ref 21–32)
COLOR UR: YELLOW
CREAT SERPL-MCNC: 0.75 MG/DL (ref 0.5–1.05)
EGFRCR SERPLBLD CKD-EPI 2021: 87 ML/MIN/1.73M*2
EOSINOPHIL # BLD AUTO: 0.25 X10*3/UL (ref 0–0.7)
EOSINOPHIL NFR BLD AUTO: 2.6 %
ERYTHROCYTE [DISTWIDTH] IN BLOOD BY AUTOMATED COUNT: 12.1 % (ref 11.5–14.5)
GLUCOSE SERPL-MCNC: 91 MG/DL (ref 74–99)
GLUCOSE UR STRIP.AUTO-MCNC: NORMAL MG/DL
HCT VFR BLD AUTO: 43.9 % (ref 36–46)
HDLC SERPL-MCNC: 77.5 MG/DL
HGB BLD-MCNC: 14.6 G/DL (ref 12–16)
IMM GRANULOCYTES # BLD AUTO: 0.01 X10*3/UL (ref 0–0.7)
IMM GRANULOCYTES NFR BLD AUTO: 0.1 % (ref 0–0.9)
KETONES UR STRIP.AUTO-MCNC: NEGATIVE MG/DL
LDLC SERPL CALC-MCNC: 133 MG/DL
LEUKOCYTE ESTERASE UR QL STRIP.AUTO: NEGATIVE
LYMPHOCYTES # BLD AUTO: 2.26 X10*3/UL (ref 1.2–4.8)
LYMPHOCYTES NFR BLD AUTO: 23.6 %
MCH RBC QN AUTO: 30.5 PG (ref 26–34)
MCHC RBC AUTO-ENTMCNC: 33.3 G/DL (ref 32–36)
MCV RBC AUTO: 92 FL (ref 80–100)
MONOCYTES # BLD AUTO: 0.71 X10*3/UL (ref 0.1–1)
MONOCYTES NFR BLD AUTO: 7.4 %
NEUTROPHILS # BLD AUTO: 6.26 X10*3/UL (ref 1.2–7.7)
NEUTROPHILS NFR BLD AUTO: 65.3 %
NITRITE UR QL STRIP.AUTO: NEGATIVE
NON HDL CHOLESTEROL: 167 MG/DL (ref 0–149)
NRBC BLD-RTO: 0 /100 WBCS (ref 0–0)
PH UR STRIP.AUTO: 6.5 [PH]
PLATELET # BLD AUTO: 309 X10*3/UL (ref 150–450)
POTASSIUM SERPL-SCNC: 4.5 MMOL/L (ref 3.5–5.3)
PROT SERPL-MCNC: 7.5 G/DL (ref 6.4–8.2)
PROT UR STRIP.AUTO-MCNC: NEGATIVE MG/DL
RBC # BLD AUTO: 4.79 X10*6/UL (ref 4–5.2)
RBC # UR STRIP.AUTO: NEGATIVE /UL
SODIUM SERPL-SCNC: 140 MMOL/L (ref 136–145)
SP GR UR STRIP.AUTO: 1.02
TRIGL SERPL-MCNC: 169 MG/DL (ref 0–149)
UROBILINOGEN UR STRIP.AUTO-MCNC: NORMAL MG/DL
VLDL: 34 MG/DL (ref 0–40)
WBC # BLD AUTO: 9.6 X10*3/UL (ref 4.4–11.3)

## 2024-04-08 PROCEDURE — 1125F AMNT PAIN NOTED PAIN PRSNT: CPT | Performed by: INTERNAL MEDICINE

## 2024-04-08 PROCEDURE — 80061 LIPID PANEL: CPT

## 2024-04-08 PROCEDURE — 1159F MED LIST DOCD IN RCRD: CPT | Performed by: INTERNAL MEDICINE

## 2024-04-08 PROCEDURE — 81003 URINALYSIS AUTO W/O SCOPE: CPT

## 2024-04-08 PROCEDURE — 1170F FXNL STATUS ASSESSED: CPT | Performed by: INTERNAL MEDICINE

## 2024-04-08 PROCEDURE — 80053 COMPREHEN METABOLIC PANEL: CPT

## 2024-04-08 PROCEDURE — 36415 COLL VENOUS BLD VENIPUNCTURE: CPT

## 2024-04-08 PROCEDURE — 99214 OFFICE O/P EST MOD 30 MIN: CPT | Performed by: INTERNAL MEDICINE

## 2024-04-08 PROCEDURE — 85025 COMPLETE CBC W/AUTO DIFF WBC: CPT

## 2024-04-08 PROCEDURE — G0439 PPPS, SUBSEQ VISIT: HCPCS | Performed by: INTERNAL MEDICINE

## 2024-04-08 PROCEDURE — 82306 VITAMIN D 25 HYDROXY: CPT

## 2024-04-08 PROCEDURE — 93000 ELECTROCARDIOGRAM COMPLETE: CPT | Performed by: INTERNAL MEDICINE

## 2024-04-08 RX ORDER — VALACYCLOVIR HYDROCHLORIDE 500 MG/1
500 TABLET, FILM COATED ORAL DAILY
Qty: 30 TABLET | Refills: 1 | Status: SHIPPED | OUTPATIENT
Start: 2024-04-08 | End: 2025-04-08

## 2024-04-08 RX ORDER — SERTRALINE HYDROCHLORIDE 50 MG/1
50 TABLET, FILM COATED ORAL DAILY
Qty: 90 TABLET | Refills: 0 | Status: SHIPPED | OUTPATIENT
Start: 2024-04-08

## 2024-04-08 RX ORDER — METHOCARBAMOL 750 MG/1
750 TABLET, FILM COATED ORAL 3 TIMES DAILY PRN
Qty: 90 TABLET | Refills: 5 | Status: SHIPPED | OUTPATIENT
Start: 2024-04-08

## 2024-04-08 RX ORDER — EZETIMIBE 10 MG/1
10 TABLET ORAL DAILY
Qty: 30 TABLET | Refills: 5 | Status: SHIPPED | OUTPATIENT
Start: 2024-04-08 | End: 2024-10-05

## 2024-04-08 RX ORDER — TRAMADOL HYDROCHLORIDE 50 MG/1
50 TABLET ORAL EVERY 6 HOURS PRN
Qty: 120 TABLET | Refills: 0 | Status: SHIPPED | OUTPATIENT
Start: 2024-04-08 | End: 2024-06-09 | Stop reason: SDUPTHER

## 2024-04-08 ASSESSMENT — ACTIVITIES OF DAILY LIVING (ADL)
MANAGING_FINANCES: INDEPENDENT
DRESSING: INDEPENDENT
DOING_HOUSEWORK: INDEPENDENT
GROCERY_SHOPPING: INDEPENDENT
BATHING: INDEPENDENT
TAKING_MEDICATION: INDEPENDENT

## 2024-04-08 ASSESSMENT — PATIENT HEALTH QUESTIONNAIRE - PHQ9
1. LITTLE INTEREST OR PLEASURE IN DOING THINGS: NOT AT ALL
SUM OF ALL RESPONSES TO PHQ9 QUESTIONS 1 AND 2: 0
2. FEELING DOWN, DEPRESSED OR HOPELESS: NOT AT ALL
1. LITTLE INTEREST OR PLEASURE IN DOING THINGS: NOT AT ALL
SUM OF ALL RESPONSES TO PHQ9 QUESTIONS 1 AND 2: 0
2. FEELING DOWN, DEPRESSED OR HOPELESS: NOT AT ALL

## 2024-04-08 ASSESSMENT — PAIN SCALES - GENERAL: PAINLEVEL: 4

## 2024-04-08 ASSESSMENT — COLUMBIA-SUICIDE SEVERITY RATING SCALE - C-SSRS: 1. IN THE PAST MONTH, HAVE YOU WISHED YOU WERE DEAD OR WISHED YOU COULD GO TO SLEEP AND NOT WAKE UP?: NO

## 2024-04-08 NOTE — PROGRESS NOTES
"Subjective   Patient ID: Belinda Marsh is a 68 y.o. female who presents for Medicare Annual Wellness Visit Subsequent (Muscle spasms ) and Med Refill.    HPI     Review of Systems    Objective   /77 (BP Location: Left arm, Patient Position: Sitting, BP Cuff Size: Small adult)   Pulse 59   Ht 1.6 m (5' 3\")   Wt 61.2 kg (135 lb)   SpO2 97%   BMI 23.91 kg/m²     Physical Exam    Assessment/Plan          "

## 2024-04-08 NOTE — PROGRESS NOTES
"Subjective   Reason for Visit: Ambar Marsh is an 68 y.o. female here for a Medicare Wellness visit.     Past Medical, Surgical, and Family History reviewed and updated in chart.    Reviewed all medications by prescribing practitioner or clinical pharmacist (such as prescriptions, OTCs, herbal therapies and supplements) and documented in the medical record.    HPI patient presents to clinic for follow-up visit on chronic lumbar radiculopathy, hyperlipidemia, essential tremors, former tobacco use, anxiety disorder, mild carotid artery disease, genital herpes, osteoarthritis, osteopenia, cervical spondylosis and muscle spasm of lower back, she has been complaining of chronic intermittent low back pain and is requesting a referral to pain management.  She is also here for Medicare annual wellness exam.  She forgot to get any recent blood work done.  She is also been feeling depressed at times due to her home situation.  She denies any suicidal ideation, agitation, restlessness, palpitation and hallucination.  EKG done shows sinus bradycardia at 59 bpm with normal axis normal interval with nonspecific T wave abnormalities.    Patient Care Team:  Bernie Anne MD as PCP - General  Bernie Soler DO (Internal Medicine)  Bernie Anne MD as Primary Care Provider     Review of Systems   Constitutional: Negative.    HENT: Negative.     Eyes: Negative.    Respiratory: Negative.     Cardiovascular: Negative.    Gastrointestinal: Negative.    Endocrine: Negative.    Genitourinary: Negative.    Musculoskeletal:  Positive for back pain.   Skin: Negative.    Allergic/Immunologic: Negative.    Neurological: Negative.    Hematological: Negative.    Psychiatric/Behavioral: Negative.         Objective   Vitals:  /77 (BP Location: Left arm, Patient Position: Sitting, BP Cuff Size: Small adult)   Pulse 59   Ht 1.6 m (5' 3\")   Wt 61.2 kg (135 lb)   SpO2 97%   BMI 23.91 kg/m²       Physical Exam  Constitutional:     "   Appearance: Normal appearance. She is normal weight.   HENT:      Right Ear: Tympanic membrane normal.      Left Ear: Tympanic membrane and ear canal normal.      Nose: Nose normal.   Neck:      Vascular: No carotid bruit.   Cardiovascular:      Rate and Rhythm: Normal rate.   Pulmonary:      Effort: No respiratory distress.      Breath sounds: No stridor. No wheezing.   Abdominal:      Palpations: Abdomen is soft.      Tenderness: There is no abdominal tenderness. There is no guarding or rebound.   Skin:     Coloration: Skin is not jaundiced.      Findings: No bruising.   Neurological:      General: No focal deficit present.      Mental Status: She is alert and oriented to person, place, and time.   Psychiatric:         Mood and Affect: Mood normal.         Assessment/Plan   Problem List Items Addressed This Visit    None  Visit Diagnoses       Sciatic leg pain        Healthcare maintenance            Patient will be scheduled for bone density regarding menopausal state, CT cardiac scoring regarding screening for heart disease, mammogram for breast cancer screening and ultrasound abdominal regarding screening for abdominal aortic aneurysm.  She is refusing vaccination.  She will be referred to pain management regarding chronic back pain.  She will be scheduled for routine blood work.  She will be notified about test results and will continue to follow-up with Dr. Anne in 3 to 6 months.  She is also advised to consider seeing a psychiatrist regarding her depression and anxiety.

## 2024-04-14 ASSESSMENT — ENCOUNTER SYMPTOMS
EYES NEGATIVE: 1
ENDOCRINE NEGATIVE: 1
CONSTITUTIONAL NEGATIVE: 1
CARDIOVASCULAR NEGATIVE: 1
RESPIRATORY NEGATIVE: 1
NEUROLOGICAL NEGATIVE: 1
PSYCHIATRIC NEGATIVE: 1
GASTROINTESTINAL NEGATIVE: 1
ALLERGIC/IMMUNOLOGIC NEGATIVE: 1
BACK PAIN: 1
HEMATOLOGIC/LYMPHATIC NEGATIVE: 1

## 2024-04-29 ENCOUNTER — HOSPITAL ENCOUNTER (OUTPATIENT)
Dept: RADIOLOGY | Facility: CLINIC | Age: 69
Discharge: HOME | End: 2024-04-29
Payer: MEDICARE

## 2024-04-29 VITALS — BODY MASS INDEX: 23.05 KG/M2 | HEIGHT: 64 IN | WEIGHT: 135 LBS

## 2024-04-29 DIAGNOSIS — Z12.31 ENCOUNTER FOR SCREENING MAMMOGRAM FOR BREAST CANCER: ICD-10-CM

## 2024-04-29 PROCEDURE — 77067 SCR MAMMO BI INCL CAD: CPT | Performed by: STUDENT IN AN ORGANIZED HEALTH CARE EDUCATION/TRAINING PROGRAM

## 2024-04-29 PROCEDURE — 77067 SCR MAMMO BI INCL CAD: CPT

## 2024-04-29 PROCEDURE — 77063 BREAST TOMOSYNTHESIS BI: CPT | Performed by: STUDENT IN AN ORGANIZED HEALTH CARE EDUCATION/TRAINING PROGRAM

## 2024-05-23 ENCOUNTER — OFFICE VISIT (OUTPATIENT)
Dept: PAIN MEDICINE | Facility: CLINIC | Age: 69
End: 2024-05-23
Payer: MEDICARE

## 2024-05-23 ENCOUNTER — OFFICE VISIT (OUTPATIENT)
Dept: OPHTHALMOLOGY | Facility: CLINIC | Age: 69
End: 2024-05-23
Payer: MEDICARE

## 2024-05-23 VITALS
HEART RATE: 63 BPM | BODY MASS INDEX: 23.05 KG/M2 | HEIGHT: 64 IN | OXYGEN SATURATION: 97 % | WEIGHT: 135 LBS | DIASTOLIC BLOOD PRESSURE: 77 MMHG | SYSTOLIC BLOOD PRESSURE: 127 MMHG

## 2024-05-23 DIAGNOSIS — H52.13 MYOPIA WITH PRESBYOPIA OF BOTH EYES: Primary | ICD-10-CM

## 2024-05-23 DIAGNOSIS — Z98.890 HX OF LASIK: ICD-10-CM

## 2024-05-23 DIAGNOSIS — M54.16 CHRONIC LUMBAR RADICULOPATHY: Primary | ICD-10-CM

## 2024-05-23 DIAGNOSIS — M62.830 MUSCLE SPASM OF BACK: ICD-10-CM

## 2024-05-23 DIAGNOSIS — H52.4 MYOPIA WITH PRESBYOPIA OF BOTH EYES: Primary | ICD-10-CM

## 2024-05-23 PROCEDURE — 99204 OFFICE O/P NEW MOD 45 MIN: CPT | Performed by: PHYSICAL MEDICINE & REHABILITATION

## 2024-05-23 PROCEDURE — 1159F MED LIST DOCD IN RCRD: CPT | Performed by: PHYSICAL MEDICINE & REHABILITATION

## 2024-05-23 PROCEDURE — 1125F AMNT PAIN NOTED PAIN PRSNT: CPT | Performed by: PHYSICAL MEDICINE & REHABILITATION

## 2024-05-23 PROCEDURE — 1036F TOBACCO NON-USER: CPT | Performed by: PHYSICAL MEDICINE & REHABILITATION

## 2024-05-23 PROCEDURE — 92015 DETERMINE REFRACTIVE STATE: CPT | Performed by: STUDENT IN AN ORGANIZED HEALTH CARE EDUCATION/TRAINING PROGRAM

## 2024-05-23 PROCEDURE — 92004 COMPRE OPH EXAM NEW PT 1/>: CPT | Performed by: STUDENT IN AN ORGANIZED HEALTH CARE EDUCATION/TRAINING PROGRAM

## 2024-05-23 PROCEDURE — 1160F RVW MEDS BY RX/DR IN RCRD: CPT | Performed by: PHYSICAL MEDICINE & REHABILITATION

## 2024-05-23 RX ORDER — GABAPENTIN 300 MG/1
CAPSULE ORAL
Qty: 180 CAPSULE | Refills: 2 | Status: SHIPPED | OUTPATIENT
Start: 2024-05-23 | End: 2024-05-23

## 2024-05-23 ASSESSMENT — ENCOUNTER SYMPTOMS
CONSTITUTIONAL NEGATIVE: 0
CARDIOVASCULAR NEGATIVE: 0
MUSCULOSKELETAL NEGATIVE: 0
NEUROLOGICAL NEGATIVE: 0
EYES NEGATIVE: 0
ENDOCRINE NEGATIVE: 0
RESPIRATORY NEGATIVE: 0
GASTROINTESTINAL NEGATIVE: 0
HEMATOLOGIC/LYMPHATIC NEGATIVE: 0
ALLERGIC/IMMUNOLOGIC NEGATIVE: 0
PSYCHIATRIC NEGATIVE: 0

## 2024-05-23 ASSESSMENT — CONF VISUAL FIELD
METHOD: COUNTING FINGERS
OS_NORMAL: 1
OD_SUPERIOR_TEMPORAL_RESTRICTION: 0
OS_INFERIOR_NASAL_RESTRICTION: 0
OD_INFERIOR_TEMPORAL_RESTRICTION: 0
OD_NORMAL: 1
OD_INFERIOR_NASAL_RESTRICTION: 0
OS_SUPERIOR_TEMPORAL_RESTRICTION: 0
OS_INFERIOR_TEMPORAL_RESTRICTION: 0
OD_SUPERIOR_NASAL_RESTRICTION: 0
OS_SUPERIOR_NASAL_RESTRICTION: 0

## 2024-05-23 ASSESSMENT — SLIT LAMP EXAM - LIDS
COMMENTS: NORMAL
COMMENTS: NORMAL

## 2024-05-23 ASSESSMENT — PAIN SCALES - GENERAL: PAINLEVEL_OUTOF10: 7

## 2024-05-23 ASSESSMENT — REFRACTION_MANIFEST
OS_AXIS: 143
OD_SPHERE: -0.25
OS_ADD: +2.50
OD_CYLINDER: -0.50
OS_CYLINDER: -0.25
OS_SPHERE: -1.00
OD_AXIS: 031
OD_ADD: +2.50

## 2024-05-23 ASSESSMENT — VISUAL ACUITY
OS_SC+: -1
METHOD: SNELLEN - LINEAR
OD_SC+: -2
OD_SC: 20/20
OS_SC: 20/50

## 2024-05-23 ASSESSMENT — TONOMETRY
IOP_METHOD: GOLDMANN APPLANATION
OD_IOP_MMHG: 16
OS_IOP_MMHG: 16

## 2024-05-23 ASSESSMENT — PAIN - FUNCTIONAL ASSESSMENT: PAIN_FUNCTIONAL_ASSESSMENT: 0-10

## 2024-05-23 ASSESSMENT — CUP TO DISC RATIO
OD_RATIO: .35
OS_RATIO: .40

## 2024-05-23 ASSESSMENT — EXTERNAL EXAM - LEFT EYE: OS_EXAM: NORMAL

## 2024-05-23 ASSESSMENT — EXTERNAL EXAM - RIGHT EYE: OD_EXAM: NORMAL

## 2024-05-23 NOTE — PROGRESS NOTES
"Subjective   Patient ID: Ambar Marsh \"Jolanta" is a 68 y.o. female with history of HLD, anxiety, fibromyalgia who presents for Back Pain (67 y/o female c/o back and leg pain).    HPI    Patient states she has had chronic lower back pain with sciatica for over 20 years. She describes the pain as aching across her lower back that radiates into her bl buttocks down to the mid thigh as an intense burning sensation. She also endorses paresthesias down both legs down to the feet without numbness or weakness. She has completed physical therapy and has been taking tramadol 3-4 times per day for almost 20 years. Lumbar MRI from 2021 shows moderate canal and foraminal stenosis at the L4-5 level. More recently 2 years ago, the patient starting having some muscle spasm pain in her thoracic area originating from her back and sides and radiating to her front. This pain has improved with regular robaxin treatment. Patient has tried gabapentin in the past for her back pain but stopped due to side effects.    OARRS:  No data recorded  I have personally reviewed the OARRS report for Ambar Marsh. I have considered the risks of abuse, dependence, addiction and diversion    Is the patient prescribed a combination of a benzodiazepine and opioid?  No    Last Urine Drug Screen / ordered today: No  Recent Results (from the past 8760 hour(s))   Drug Screen, Urine With Reflex to Confirmation    Collection Time: 11/14/23 12:05 PM   Result Value Ref Range    Amphetamine Screen, Urine Presumptive Negative Presumptive Negative    Barbiturate Screen, Urine Presumptive Negative Presumptive Negative    Benzodiazepines Screen, Urine Presumptive Negative Presumptive Negative    Cannabinoid Screen, Urine Presumptive Negative Presumptive Negative    Cocaine Metabolite Screen, Urine Presumptive Negative Presumptive Negative    Fentanyl Screen, Urine Presumptive Negative Presumptive Negative    Opiate Screen, Urine Presumptive Negative Presumptive " Negative    Oxycodone Screen, Urine Presumptive Negative Presumptive Negative    PCP Screen, Urine Presumptive Negative Presumptive Negative     Results are as expected.       Monitoring and compliance:    ORT:    PDUQ:    Office Agreement:      Review of Systems     Current Outpatient Medications   Medication Instructions    ezetimibe (ZETIA) 10 mg, oral, Daily    methocarbamol (ROBAXIN) 750 mg, oral, 3 times daily PRN    sertraline (ZOLOFT) 50 mg, oral, Daily    traMADol (ULTRAM) 50 mg, oral, Every 6 hours PRN    valACYclovir (VALTREX) 500 mg, oral, Daily        Past Medical History:   Diagnosis Date    Acute sinusitis, unspecified 01/29/2019    Acute non-recurrent sinusitis, unspecified location    Body mass index (BMI) 21.0-21.9, adult 02/25/2020    BMI 21.0-21.9, adult    Body mass index (BMI) 22.0-22.9, adult 01/14/2021    BMI 22.0-22.9, adult    Body mass index (BMI) 22.0-22.9, adult     BMI 22.0-22.9, adult    Body mass index (BMI) 22.0-22.9, adult 10/21/2021    BMI 22.0-22.9, adult    Body mass index (BMI) 23.0-23.9, adult     BMI 23.0-23.9, adult    Body mass index (BMI) 23.0-23.9, adult 08/05/2021    BMI 23.0-23.9, adult    Body mass index (BMI) 23.0-23.9, adult 09/07/2021    BMI 23.0-23.9, adult    Dependent relative needing care at home 06/27/2019    Caregiver stress    Diarrhea, unspecified 10/26/2021    Acute diarrhea    Disease of spinal cord, unspecified (Multi) 08/05/2021    Lumbar myelopathy    Encounter for screening for infections with a predominantly sexual mode of transmission 12/08/2016    Screen for STD (sexually transmitted disease)    Epigastric pain 12/05/2018    Abdominal pain, chronic, epigastric    Medial epicondylitis, right elbow 01/14/2021    Medial epicondylitis of right elbow    Migraine without aura, intractable, with status migrainosus 10/02/2019    Intractable migraine without aura and with status migrainosus    Other conditions influencing health status 12/05/2018    Chronic  migraine    Other forms of dyspnea 2016    Dyspnea on exertion    Pain in right elbow     Right elbow pain    Pain in right knee     Knee pain, right    Pain in right wrist     Right wrist pain    Pain in unspecified hand 03/15/2016    Pain of hand    Pain in unspecified knee     Knee pain    Personal history of other diseases of the digestive system 2019    History of small bowel obstruction    Personal history of other diseases of the musculoskeletal system and connective tissue 2017    History of neck pain    Personal history of other endocrine, nutritional and metabolic disease 2017    History of hyperglycemia    Personal history of other medical treatment 2016    History of screening mammography    Personal history of other mental and behavioral disorders 2020    History of anxiety    Personal history of other specified conditions 2021    History of confusion    Personal history of other specified conditions 2016    History of chronic cough    Personal history of other specified conditions 2020    History of chest pain at rest    Personal history of other specified conditions 10/21/2021    History of diarrhea    Radiculopathy, cervical region 2021    Cervical radiculopathy at C5    Sciatica, left side 2020    Sciatica of left side    Unspecified abdominal pain 10/21/2021    Pain, abdominal, nonspecific    Unspecified abdominal pain 10/26/2021    Abdominal pain in female patient    Urinary tract infection, site not specified 2021    Acute UTI        Past Surgical History:   Procedure Laterality Date    APPENDECTOMY  2016    Appendectomy     SECTION, CLASSIC  2016     Section    LASIK  2004        Family History   Problem Relation Name Age of Onset    Lung cancer Mother      Breast cancer Mother      Heart disease Father      Diabetes Brother Dead     Hypertension Brother Dead     Hypertension Brother      Gout  Brother      Diabetes Maternal Grandmother      Breast cancer Paternal Grandmother          Allergies   Allergen Reactions    Codeine Itching    Ibuprofen Unknown    Levofloxacin Other    Prednisone Other    Statins-Hmg-Coa Reductase Inhibitors Other    Sulfamethoxazole Hives    Sulfamethoxazole-Trimethoprim Other    Sulfa (Sulfonamide Antibiotics) Rash        Objective     Vitals:    05/23/24 1104   BP: 127/77   Pulse: 63   SpO2: 97%        Physical Exam    GENERAL EXAM  Vital Signs: Vital signs to include heart rate, respiration rate, blood pressure, and temperature were reviewed.  General Appearance:  Awake, alert, healthy appearing, well developed, No acute distress.  Head: Normocephalic without evidence of head injury.  Neck: The appearance of the neck was normal without swelling with a midline trachea.  Eyes: The eyelids and eyebrows exhibited no abnormalities.  Pupils were not pin-point.  Sclera was without icterus.  Lungs: Respiration rhythm and depth was normal.  Respiratory movements were normal without labored breathing.  Cardiovascular: No peripheral edema was present.    Neurological: Patient was oriented to time, place, and person.  Speech was normal.  Balance, gait, and stance were unremarkable.    Psychiatric: Appearance was normal with appropriate dress.  Mood was euthymic and affect was normal.  Skin: Affected regions were without ecchymosis or skin lesions.        Physical exam as above except:  Thoracic: Mild tenderness to palpation on bl flanks in T7-8 distribution. There are no sensory changes.    Lumbar: Mild tenderness in lumbar midline at L4-5. Negative straight leg raise bl    Extremities: Normal strength, sensation, reflexes bl      Assessment/Plan   Diagnoses and all orders for this visit:  Chronic lumbar radiculopathy  -     Referral to Pain Medicine  -     Epidural Steroid Injection; Future  Muscle spasm of back  -     Referral to Pain Medicine  -     Epidural Steroid Injection;  Future    Patient appears to have lumbar radicular pain in the setting of degenerative disease of the lumbar spine and lumbar stenosis. Her newer thoracic pain appears muscular in nature. She has been taking tramadol for many years which may be further sensitizing her to pain.  We did discuss opioid-induced hyperalgesia which I am worried that she is starting to experience as well as tolerance due to being on tramadol for a number of years.  Even though tramadol is a week opioid agonist it still can cause similar side effects of hyperalgesia and tolerance is due more stronger opioid agonist.  She is not interested in starting gabapentin or any other medications at this time.    - Will schedule L4-5 interlaminar epidural steroid injection for the lumbar neuritis  - Can consider repeat MRI lumbar spine in the future if symptoms don't improve  - Continue PT and physician directed home exercises  - Advised the patient to wean off tramadol if possible       This note was generated with the aid of dictation software, there may be typos despite my attempts at proofreading.     I saw and evaluated the patient. I personally obtained the key and critical portions of the history and physical exam or was physically present for key and critical portions performed by the resident/fellow. I reviewed the resident/fellow's documentation and discussed the patient with the resident/fellow. I agree with the resident/fellow's medical decision making as documented in the note.    Lizandro Bhatti MD

## 2024-05-23 NOTE — PROGRESS NOTES
Assessment/Plan   Diagnoses and all orders for this visit:  Myopia with presbyopia of both eyes  Hx of LASIK  -New spec rx released today per patient request. Ocular health wnl for age OU. Monitor 1 year or sooner prn. Refraction billed today.  -Patient prefers to split specs DVO/NVO-had trouble previously with PAL/bifocal  -Hx of LASIK  -BCVA 20/20 OD+OS    RTC 1 year for annual with DFE and MRX

## 2024-05-28 ENCOUNTER — TELEPHONE (OUTPATIENT)
Dept: PRIMARY CARE | Facility: CLINIC | Age: 69
End: 2024-05-28
Payer: MEDICARE

## 2024-05-29 DIAGNOSIS — M54.16 CHRONIC LUMBAR RADICULOPATHY: Primary | ICD-10-CM

## 2024-06-09 DIAGNOSIS — M54.30 SCIATIC LEG PAIN: ICD-10-CM

## 2024-06-09 RX ORDER — TRAMADOL HYDROCHLORIDE 50 MG/1
50 TABLET ORAL EVERY 6 HOURS PRN
Qty: 120 TABLET | Refills: 0 | Status: SHIPPED | OUTPATIENT
Start: 2024-06-09

## 2024-06-10 ENCOUNTER — HOSPITAL ENCOUNTER (OUTPATIENT)
Dept: VASCULAR MEDICINE | Facility: CLINIC | Age: 69
Discharge: HOME | End: 2024-06-10
Payer: MEDICARE

## 2024-06-10 ENCOUNTER — HOSPITAL ENCOUNTER (OUTPATIENT)
Dept: RADIOLOGY | Facility: CLINIC | Age: 69
Discharge: HOME | End: 2024-06-10
Payer: MEDICARE

## 2024-06-10 DIAGNOSIS — Z13.6 SCREENING FOR CARDIOVASCULAR CONDITION: ICD-10-CM

## 2024-06-10 DIAGNOSIS — Z13.6 SCREENING FOR AAA (ABDOMINAL AORTIC ANEURYSM): ICD-10-CM

## 2024-06-10 DIAGNOSIS — Z78.0 ASYMPTOMATIC MENOPAUSAL STATE: ICD-10-CM

## 2024-06-10 PROCEDURE — 76706 US ABDL AORTA SCREEN AAA: CPT

## 2024-06-10 PROCEDURE — 75571 CT HRT W/O DYE W/CA TEST: CPT

## 2024-06-10 PROCEDURE — 76706 US ABDL AORTA SCREEN AAA: CPT | Performed by: SURGERY

## 2024-06-10 PROCEDURE — 77080 DXA BONE DENSITY AXIAL: CPT

## 2024-06-13 ENCOUNTER — OFFICE VISIT (OUTPATIENT)
Dept: OPHTHALMOLOGY | Facility: CLINIC | Age: 69
End: 2024-06-13
Payer: MEDICARE

## 2024-06-13 DIAGNOSIS — H43.811 PVD (POSTERIOR VITREOUS DETACHMENT), RIGHT EYE: Primary | ICD-10-CM

## 2024-06-13 PROCEDURE — 92012 INTRM OPH EXAM EST PATIENT: CPT | Performed by: STUDENT IN AN ORGANIZED HEALTH CARE EDUCATION/TRAINING PROGRAM

## 2024-06-13 ASSESSMENT — ENCOUNTER SYMPTOMS
CONSTITUTIONAL NEGATIVE: 0
GASTROINTESTINAL NEGATIVE: 0
HEMATOLOGIC/LYMPHATIC NEGATIVE: 0
CARDIOVASCULAR NEGATIVE: 0
PSYCHIATRIC NEGATIVE: 0
ENDOCRINE NEGATIVE: 0
RESPIRATORY NEGATIVE: 0
EYES NEGATIVE: 0
MUSCULOSKELETAL NEGATIVE: 0
NEUROLOGICAL NEGATIVE: 0
ALLERGIC/IMMUNOLOGIC NEGATIVE: 0

## 2024-06-13 ASSESSMENT — EXTERNAL EXAM - RIGHT EYE: OD_EXAM: NORMAL

## 2024-06-13 ASSESSMENT — CONF VISUAL FIELD
OS_NORMAL: 1
OD_NORMAL: 1
OS_SUPERIOR_TEMPORAL_RESTRICTION: 0
OD_INFERIOR_NASAL_RESTRICTION: 0
OS_INFERIOR_NASAL_RESTRICTION: 0
OD_INFERIOR_TEMPORAL_RESTRICTION: 0
OD_SUPERIOR_TEMPORAL_RESTRICTION: 0
OD_SUPERIOR_NASAL_RESTRICTION: 0
OS_INFERIOR_TEMPORAL_RESTRICTION: 0
OS_SUPERIOR_NASAL_RESTRICTION: 0

## 2024-06-13 ASSESSMENT — TONOMETRY
OS_IOP_MMHG: 14
OD_IOP_MMHG: 12
IOP_METHOD: GOLDMANN APPLANATION

## 2024-06-13 ASSESSMENT — VISUAL ACUITY
OS_SC: 20/25+2
OD_SC: 20/20
METHOD: SNELLEN - LINEAR

## 2024-06-13 ASSESSMENT — SLIT LAMP EXAM - LIDS
COMMENTS: NORMAL
COMMENTS: NORMAL

## 2024-06-13 ASSESSMENT — CUP TO DISC RATIO
OS_RATIO: .40
OD_RATIO: .35

## 2024-06-13 ASSESSMENT — EXTERNAL EXAM - LEFT EYE: OS_EXAM: NORMAL

## 2024-06-13 NOTE — PROGRESS NOTES
Assessment/Plan   Diagnoses and all orders for this visit:  PVD (posterior vitreous detachment), right eye  -new PVD OD accounting for symptoms  -discussed etiology with patient  -healthy retinal exam without holes or tears  -Discussed signs and symtpoms of retinal hole, tear, detachment. Patient educated that retinal detachment can lead to permanent vision loss. Patient consents to return if they notice new floaters, flashes, curtain or veil covering vision.      RTC for annual 1 year with KIKE and OKSANA

## 2024-06-24 DIAGNOSIS — M54.16 CHRONIC LUMBAR RADICULOPATHY: Primary | ICD-10-CM

## 2024-06-26 ENCOUNTER — HOSPITAL ENCOUNTER (OUTPATIENT)
Dept: OPERATING ROOM | Facility: CLINIC | Age: 69
Setting detail: OUTPATIENT SURGERY
Discharge: HOME | End: 2024-06-26
Payer: MEDICARE

## 2024-06-26 VITALS
BODY MASS INDEX: 23.37 KG/M2 | DIASTOLIC BLOOD PRESSURE: 65 MMHG | WEIGHT: 136.91 LBS | HEART RATE: 65 BPM | TEMPERATURE: 97.5 F | OXYGEN SATURATION: 98 % | HEIGHT: 64 IN | RESPIRATION RATE: 16 BRPM | SYSTOLIC BLOOD PRESSURE: 132 MMHG

## 2024-06-26 DIAGNOSIS — M54.16 CHRONIC LUMBAR RADICULOPATHY: ICD-10-CM

## 2024-06-26 DIAGNOSIS — M62.830 MUSCLE SPASM OF BACK: ICD-10-CM

## 2024-06-26 PROCEDURE — 2500000005 HC RX 250 GENERAL PHARMACY W/O HCPCS: Performed by: PHYSICAL MEDICINE & REHABILITATION

## 2024-06-26 PROCEDURE — 2550000001 HC RX 255 CONTRASTS: Performed by: PHYSICAL MEDICINE & REHABILITATION

## 2024-06-26 PROCEDURE — 3600000001 HC OR TIME - INITIAL BASE CHARGE - PROCEDURE LEVEL ONE

## 2024-06-26 PROCEDURE — 7100000010 HC PHASE TWO TIME - EACH INCREMENTAL 1 MINUTE

## 2024-06-26 PROCEDURE — 7100000009 HC PHASE TWO TIME - INITIAL BASE CHARGE

## 2024-06-26 PROCEDURE — 3600000006 HC OR TIME - EACH INCREMENTAL 1 MINUTE - PROCEDURE LEVEL ONE

## 2024-06-26 PROCEDURE — 2500000004 HC RX 250 GENERAL PHARMACY W/ HCPCS (ALT 636 FOR OP/ED): Performed by: PHYSICAL MEDICINE & REHABILITATION

## 2024-06-26 RX ORDER — LIDOCAINE HYDROCHLORIDE 5 MG/ML
INJECTION, SOLUTION INFILTRATION; INTRAVENOUS AS NEEDED
Status: COMPLETED | OUTPATIENT
Start: 2024-06-26 | End: 2024-06-26

## 2024-06-26 RX ORDER — METHYLPREDNISOLONE ACETATE 40 MG/ML
INJECTION, SUSPENSION INTRA-ARTICULAR; INTRALESIONAL; INTRAMUSCULAR; SOFT TISSUE AS NEEDED
Status: COMPLETED | OUTPATIENT
Start: 2024-06-26 | End: 2024-06-26

## 2024-06-26 ASSESSMENT — COLUMBIA-SUICIDE SEVERITY RATING SCALE - C-SSRS
2. HAVE YOU ACTUALLY HAD ANY THOUGHTS OF KILLING YOURSELF?: NO
6. HAVE YOU EVER DONE ANYTHING, STARTED TO DO ANYTHING, OR PREPARED TO DO ANYTHING TO END YOUR LIFE?: NO
1. IN THE PAST MONTH, HAVE YOU WISHED YOU WERE DEAD OR WISHED YOU COULD GO TO SLEEP AND NOT WAKE UP?: NO

## 2024-06-26 ASSESSMENT — PAIN DESCRIPTION - DESCRIPTORS: DESCRIPTORS: ACHING

## 2024-06-26 ASSESSMENT — PAIN SCALES - GENERAL
PAINLEVEL_OUTOF10: 1
PAINLEVEL_OUTOF10: 0 - NO PAIN

## 2024-06-26 ASSESSMENT — PAIN - FUNCTIONAL ASSESSMENT: PAIN_FUNCTIONAL_ASSESSMENT: 0-10

## 2024-06-26 NOTE — DISCHARGE INSTRUCTIONS
Discharge Instructions for Anesthesiology Pain Service Patients - Dr. Bhatti    Observe the needle site for excessive bleeding (slow general oozing that completely soaks the dressing or fresh bright red bleeding).  In either case, apply pressure to the area, elevate it if possible and call your doctor at once.    Observe/monitor for the following signs and symptoms:         Needle site:  change in color, numbness or tingling, coldness to touch, swelling, drainage       Temperature of 101.5 or higher       Increased or uncontrollable pain    If you notice any of the above signs or symptoms, please call your doctor at once.    Your pain may not be gone immediately after this procedure; it generally takes 3 to 5 days for the steroid to work.    Keep the needle site clean and dry for 24 hours.    Continue your present medications.    No driving or operating machinery for 24 hours if you have received sedation.    Make an appointment to see you doctor in 2-3 weeks    Central Scheduling Number:  [phone]  Dr. Bhatti's office:  [phone]  Dr. Bhatti's line:  [phone]  After hours Pain Management:  [phone].    If any problems occur, or if you have further questions, please call you doctor as soon as possible.  If you find that you cannot reach your doctor, but feel that your condition needs a doctors attention, go to the  emergency room nearest your home.

## 2024-06-26 NOTE — H&P
"Pain Management H&P    History Of Present Illness  Ambar M Moviel \"Belinda\" is a 68 y.o. female presents for procedure state below. Endorses no changes in past medical history or medical health since last seen in clinic.      Past Medical History  She has a past medical history of Acute sinusitis, unspecified (01/29/2019), Body mass index (BMI) 21.0-21.9, adult (02/25/2020), Body mass index (BMI) 22.0-22.9, adult (01/14/2021), Body mass index (BMI) 22.0-22.9, adult, Body mass index (BMI) 22.0-22.9, adult (10/21/2021), Body mass index (BMI) 23.0-23.9, adult, Body mass index (BMI) 23.0-23.9, adult (08/05/2021), Body mass index (BMI) 23.0-23.9, adult (09/07/2021), Dependent relative needing care at home (06/27/2019), Diarrhea, unspecified (10/26/2021), Disease of spinal cord, unspecified (Multi) (08/05/2021), Encounter for screening for infections with a predominantly sexual mode of transmission (12/08/2016), Epigastric pain (12/05/2018), Medial epicondylitis, right elbow (01/14/2021), Migraine without aura, intractable, with status migrainosus (10/02/2019), Other conditions influencing health status (12/05/2018), Other forms of dyspnea (05/25/2016), Pain in right elbow, Pain in right knee, Pain in right wrist, Pain in unspecified hand (03/15/2016), Pain in unspecified knee, Personal history of other diseases of the digestive system (01/29/2019), Personal history of other diseases of the musculoskeletal system and connective tissue (05/04/2017), Personal history of other endocrine, nutritional and metabolic disease (05/04/2017), Personal history of other medical treatment (05/25/2016), Personal history of other mental and behavioral disorders (02/25/2020), Personal history of other specified conditions (08/05/2021), Personal history of other specified conditions (05/25/2016), Personal history of other specified conditions (08/25/2020), Personal history of other specified conditions (10/21/2021), Radiculopathy, cervical " region (2021), Sciatica, left side (2020), Unspecified abdominal pain (10/21/2021), Unspecified abdominal pain (10/26/2021), and Urinary tract infection, site not specified (2021).    Surgical History  She has a past surgical history that includes  section, classic (2016); Appendectomy (2016); and LASIK ().     Social History  She reports that she has quit smoking. Her smoking use included cigarettes. She has never used smokeless tobacco. She reports that she does not currently use alcohol. She reports that she does not currently use drugs.    Family History  Family History   Problem Relation Name Age of Onset    Lung cancer Mother      Breast cancer Mother      Heart disease Father      Diabetes Brother Dead     Hypertension Brother Dead     Hypertension Brother      Gout Brother      Diabetes Maternal Grandmother      Breast cancer Paternal Grandmother          Allergies  Codeine, Ibuprofen, Levofloxacin, Prednisone, Statins-hmg-coa reductase inhibitors, Sulfamethoxazole, Sulfamethoxazole-trimethoprim, and Sulfa (sulfonamide antibiotics)    Review of Symptoms:   Constitutional: Negative for chills, diaphoresis or fever  HENT: Negative for neck swelling  Eyes:.  Negative for eye pain  Respiratory:.  Negative for cough, shortness of breath or wheezing    Cardiovascular:.  Negative for chest pain or palpitations  Gastrointestinal:.  Negative for abdominal pain, nausea and vomiting  Genitourinary:.  Negative for urgency  Musculoskeletal:  Positive for back pain. Positive for joint pain. Denies falls within the past 3 months.  Skin: Negative for wounds or itching   Neurological: Negative for dizziness, seizures, loss of consciousness and weakness  Endo/Heme/Allergies: Does not bruise/bleed easily  Psychiatric/Behavioral: Negative for depression. The patient does not appear anxious.       PHYSICAL EXAM  Vitals signs reviewed  Constitutional:       General: Not in acute  "distress     Appearance: Normal appearance. Not ill-appearing.  HENT:     Head: Normocephalic and atraumatic  Eyes:     Conjunctiva/sclera: Conjunctivae normal  Cardiovascular:     Rate and Rhythm: Normal rate and regular rhythm  Pulmonary:     Effort: No respiratory distress  Abdominal:     Palpations: Abdomen is soft  Musculoskeletal: DUBOSE  Skin:     General: Skin is warm and dry  Neurological:     General: No focal deficit present  Psychiatric:         Mood and Affect: Mood normal         Behavior: Behavior normal     Last Recorded Vitals  There were no vitals taken for this visit.    Relevant Results  Current Outpatient Medications   Medication Instructions    ezetimibe (ZETIA) 10 mg, oral, Daily    methocarbamol (ROBAXIN) 750 mg, oral, 3 times daily PRN    sertraline (ZOLOFT) 50 mg, oral, Daily    traMADol (ULTRAM) 50 mg, oral, Every 6 hours PRN    valACYclovir (VALTREX) 500 mg, oral, Daily       No results found for this or any previous visit from the past 1000 days.     No image results found.       1. Muscle spasm of back  Epidural Steroid Injection    Epidural Steroid Injection      2. Chronic lumbar radiculopathy  Epidural Steroid Injection    Epidural Steroid Injection    FL pain management    FL pain management           ASSESSMENT/PLAN  Ambar Hermanl \"Belinda\" is a 68 y.o. female presents for L4-5 interlaminar epidural steroid     Patient denies any recent antibiotic use or infections, denies any blood thinner use, and denies contrast or local anesthetic allergies     Risks, benefits, alternatives discussed. All questions answered to the best of my ability. Patient agrees to proceed.      Our plan is as follows:  - Proceed with aforementioned procedure          Enma Crawley DO   Pain fellow     "

## 2024-06-27 ASSESSMENT — PAIN SCALES - GENERAL: PAINLEVEL_OUTOF10: 1

## 2024-06-27 NOTE — ADDENDUM NOTE
Encounter addended by: Meaghan Polk RN on: 6/27/2024 11:32 AM   Actions taken: Contacts section saved, Flowsheet accepted

## 2024-06-28 ENCOUNTER — OFFICE VISIT (OUTPATIENT)
Dept: OBSTETRICS AND GYNECOLOGY | Facility: CLINIC | Age: 69
End: 2024-06-28
Payer: MEDICARE

## 2024-06-28 VITALS
BODY MASS INDEX: 23.39 KG/M2 | DIASTOLIC BLOOD PRESSURE: 69 MMHG | WEIGHT: 137 LBS | SYSTOLIC BLOOD PRESSURE: 139 MMHG | HEIGHT: 64 IN

## 2024-06-28 DIAGNOSIS — Z01.419 ENCOUNTER FOR ANNUAL ROUTINE GYNECOLOGICAL EXAMINATION: Primary | ICD-10-CM

## 2024-06-28 DIAGNOSIS — Z12.31 ENCOUNTER FOR SCREENING MAMMOGRAM FOR MALIGNANT NEOPLASM OF BREAST: ICD-10-CM

## 2024-06-28 DIAGNOSIS — R87.810 CERVICAL HIGH RISK HPV (HUMAN PAPILLOMAVIRUS) TEST POSITIVE: ICD-10-CM

## 2024-06-28 PROCEDURE — 99213 OFFICE O/P EST LOW 20 MIN: CPT

## 2024-06-28 ASSESSMENT — LIFESTYLE VARIABLES
HOW MANY STANDARD DRINKS CONTAINING ALCOHOL DO YOU HAVE ON A TYPICAL DAY: 1 OR 2
HOW OFTEN DO YOU HAVE A DRINK CONTAINING ALCOHOL: MONTHLY OR LESS
SKIP TO QUESTIONS 9-10: 1
HOW OFTEN DO YOU HAVE SIX OR MORE DRINKS ON ONE OCCASION: NEVER
AUDIT-C TOTAL SCORE: 1

## 2024-06-28 ASSESSMENT — ENCOUNTER SYMPTOMS
COUGH: 0
DYSURIA: 0
CHILLS: 0
SHORTNESS OF BREATH: 0
VOMITING: 0
FATIGUE: 0
DEPRESSION: 0
HEADACHES: 0
COLOR CHANGE: 0
UNEXPECTED WEIGHT CHANGE: 0
LOSS OF SENSATION IN FEET: 0
NAUSEA: 0
FEVER: 0
ABDOMINAL PAIN: 0
OCCASIONAL FEELINGS OF UNSTEADINESS: 0
DIZZINESS: 0

## 2024-06-28 ASSESSMENT — PATIENT HEALTH QUESTIONNAIRE - PHQ9
2. FEELING DOWN, DEPRESSED OR HOPELESS: NOT AT ALL
1. LITTLE INTEREST OR PLEASURE IN DOING THINGS: NOT AT ALL
SUM OF ALL RESPONSES TO PHQ9 QUESTIONS 1 & 2: 0

## 2024-06-28 ASSESSMENT — PAIN SCALES - GENERAL: PAINLEVEL: 0-NO PAIN

## 2024-06-28 NOTE — PROGRESS NOTES
"Subjective   Ambar M Moviel \"Belinda\" is a 68 y.o. female who is here for a routine menopausal visit. I last saw her 2023. She also saw Dr. Barrow 2023. Denies vaginal bleeding. Denies pelvic pain, pressure, or bloating. Denies breast changes or concerns. Denies hematochezia, rectal bleeding, or bowel changes.      Complaints:   none  HRT: no  History of abnormal Pap smear: yes  History of abnormal mammogram: no      OB History          4    Para   1    Term   1            AB   3    Living   1         SAB   3    IAB        Ectopic        Multiple        Live Births   1                  Review of Systems   Constitutional:  Negative for chills, fatigue, fever and unexpected weight change.   Respiratory:  Negative for cough and shortness of breath.    Gastrointestinal:  Negative for abdominal pain, nausea and vomiting.   Genitourinary:  Negative for dysuria, pelvic pain and vaginal discharge.   Skin:  Negative for color change and rash.   Neurological:  Negative for dizziness and headaches.       Objective   /69   Ht 1.626 m (5' 4\")   Wt 62.1 kg (137 lb)   BMI 23.52 kg/m²        General:   Alert and oriented, in no acute distress   Neck: Supple. No visible thyromegaly.    Breast/Axilla: Normal to palpation bilaterally without masses, skin changes, or nipple discharge.    Abdomen: Soft, non-tender, without masses or organomegaly   Vulva: Normal architecture without erythema, masses, or lesions.    Vagina: Normal mucosa without lesions, masses, or atrophy. No abnormal vaginal discharge.    Cervix: Normal without masses, lesions, or signs of cervicitis   Uterus: Normal, mobile, non-enlarged uterus   Adnexa: Normal without masses or lesions   Pelvic Floor Normal    Psych Normal affect. Normal mood.      Assessment/Plan   -Pap 2022 ASCUS, HPV positive; colpo 3/2022 no bx or ECC; 2023 and 2023 pap negative cytology, pos HPV. Due for repeat pap smear.   -UTD 24 negative mammogram.  -UTD " on DEXA 6/2024 osteopenia.  -UTD on colonoscopy, 2022.  -We rvwd HPV/HSV in greater depth, I answered her questions. Emotional support given.    Mireya Puente PA-C

## 2024-07-02 ENCOUNTER — APPOINTMENT (OUTPATIENT)
Dept: PRIMARY CARE | Facility: CLINIC | Age: 69
End: 2024-07-02
Payer: MEDICARE

## 2024-07-02 VITALS
DIASTOLIC BLOOD PRESSURE: 80 MMHG | TEMPERATURE: 97.5 F | BODY MASS INDEX: 22.71 KG/M2 | WEIGHT: 133 LBS | HEIGHT: 64 IN | OXYGEN SATURATION: 98 % | SYSTOLIC BLOOD PRESSURE: 118 MMHG | HEART RATE: 63 BPM

## 2024-07-02 DIAGNOSIS — Z76.89 ENCOUNTER TO ESTABLISH CARE: ICD-10-CM

## 2024-07-02 DIAGNOSIS — F32.A DEPRESSIVE DISORDER: Primary | ICD-10-CM

## 2024-07-02 DIAGNOSIS — I10 BENIGN ESSENTIAL HTN: ICD-10-CM

## 2024-07-02 DIAGNOSIS — G89.29 CHRONIC BACK PAIN, UNSPECIFIED BACK LOCATION, UNSPECIFIED BACK PAIN LATERALITY: ICD-10-CM

## 2024-07-02 DIAGNOSIS — M54.9 CHRONIC BACK PAIN, UNSPECIFIED BACK LOCATION, UNSPECIFIED BACK PAIN LATERALITY: ICD-10-CM

## 2024-07-02 DIAGNOSIS — E78.5 HYPERLIPIDEMIA, UNSPECIFIED HYPERLIPIDEMIA TYPE: ICD-10-CM

## 2024-07-02 PROBLEM — M19.049 OSTEOARTHRITIS OF HAND: Status: ACTIVE | Noted: 2024-07-02

## 2024-07-02 PROBLEM — J42 CHRONIC BRONCHITIS, UNSPECIFIED CHRONIC BRONCHITIS TYPE (MULTI): Status: RESOLVED | Noted: 2023-07-20 | Resolved: 2024-07-02

## 2024-07-02 PROBLEM — L71.9 ROSACEA: Status: ACTIVE | Noted: 2024-07-02

## 2024-07-02 PROCEDURE — 1036F TOBACCO NON-USER: CPT

## 2024-07-02 PROCEDURE — 1159F MED LIST DOCD IN RCRD: CPT

## 2024-07-02 PROCEDURE — 99204 OFFICE O/P NEW MOD 45 MIN: CPT

## 2024-07-02 PROCEDURE — 3074F SYST BP LT 130 MM HG: CPT

## 2024-07-02 PROCEDURE — 1126F AMNT PAIN NOTED NONE PRSNT: CPT

## 2024-07-02 PROCEDURE — 3079F DIAST BP 80-89 MM HG: CPT

## 2024-07-02 RX ORDER — SERTRALINE HYDROCHLORIDE 100 MG/1
100 TABLET, FILM COATED ORAL DAILY
Qty: 90 TABLET | Refills: 0 | Status: SHIPPED | OUTPATIENT
Start: 2024-07-02 | End: 2024-09-30

## 2024-07-02 ASSESSMENT — PAIN SCALES - GENERAL: PAINLEVEL: 0-NO PAIN

## 2024-07-02 NOTE — PROGRESS NOTES
"Subjective   Patient ID: Belinda Marsh is a 68 y.o. female who presents for Establish Care and Pain.    HPI   Belinda presents to establish care today.  Previously a patient of Dr. Anne, who recently retired.     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 She reports that her [facility] is [facility] well [facility], she had been on up to 150 mg of Zoloft at one point.  Dose was titrated down with her previous PCP.  She admits that lately she feels more down, depressed and frequently tearful. Denies any thoughts of SI/HI.  -Chronic DJD/Osteoarthritis- seeing Dr. Bhatti recently completed lumbar injections which was extremely helpful. She is also taking tramadol 50 mg prn for her chronic pain.   -She Dr. Adams every few years for gastritis flair.     She is requesting refill of tramadol today; however, she is established with pain management.     Review of Systems  GENERAL - Denies fever/chills, recent illness, unexplained weight loss  HEENT- Denies change in vision, double vision, blurred. Denies hearing changes, ear pain. Denies nose bleeds. Denies sore throat, difficulty swallowing.    RESP - Denies SOB or cough  CVS - Denies CP, palpitations  GI - Denies nausea or abdominal pain, hematochezia/melena  - Denies urinary frequency, urgency or incontinence.  Denies nocturia.   NEURO - Denies headache, dizziness  MSK - Chronic joint, neck or back pain  Skin - Denies abnormal lesions, rash  PSYCH-Denies anxiety, changes in mood.  Positive for increased depression      Objective   /80 (BP Location: Left arm, Patient Position: Sitting)   Pulse 63   Temp 36.4 °C (97.5 °F) (Temporal)   Ht 1.613 m (5' 3.5\")   Wt 60.3 kg (133 lb)   SpO2 98%   BMI 23.19 kg/m²     Physical Exam  Vitals and nursing note reviewed.   Constitutional:       General: She is not in acute " distress.     Appearance: Normal appearance. She is well-developed and well-groomed.   Cardiovascular:      Rate and Rhythm: Normal rate and regular rhythm.      Heart sounds: Normal heart sounds, S1 normal and S2 normal.   Pulmonary:      Effort: Pulmonary effort is normal.      Breath sounds: Normal breath sounds and air entry.   Musculoskeletal:      Right lower leg: No edema.      Left lower leg: No edema.   Skin:     General: Skin is warm and dry.   Neurological:      General: No focal deficit present.      Mental Status: She is alert.      Cranial Nerves: Cranial nerves 2-12 are intact.   Psychiatric:         Attention and Perception: Attention and perception normal.         Mood and Affect: Mood and affect normal.         Speech: Speech normal.         Behavior: Behavior normal. Behavior is cooperative.         Thought Content: Thought content normal. Thought content does not include homicidal or suicidal ideation. Thought content does not include homicidal or suicidal plan.         Cognition and Memory: Cognition and memory normal.         Judgment: Judgment normal.         Assessment/Plan   Problem List Items Addressed This Visit             ICD-10-CM    HLD (hyperlipidemia)  Chronic condition, stable with medication  Continue Zetia 10 mg daily.  No refills needed at this visit  Labs reviewed  Reports any new onset of muscle pain or weakness.  Continue with health diet low in saturated fats, cholesterol, sodium, and limit sugars.  Exercise 30-45 minutes 5 days per week.  Follow up in 6 months.    E78.5    Depressive disorder - Primary  Chronic condition, symptoms not well controlled at todays visit    Increase Zoloft to 100 mg daily as discussed  Continue with non-pharmacological interventions including:  -7-9 hours of sleep   -healthy diet  -exercise 30 minutes 5 days per week  - consider counseling, CBT as adjunct to medication.  Follow up in 4-6 weeks for re-evaluation of symptoms and dose adjustments as  needed.   F32.A    Relevant Medications    sertraline (Zoloft) 100 mg tablet     Other Visit Diagnoses         Codes    Benign essential HTN      Chronic condition, well controlled at this visit.   No medications at this visit.  Continue with healthy diet and lifestyle to control pressures  Follow up in 6 months.    I10    Chronic back pain, unspecified back location, unspecified back pain laterality      Continue to follow up with pain management as discussed.  M54.9, G89.29

## 2024-07-06 PROBLEM — K56.600 PARTIAL OBSTRUCTION OF SMALL INTESTINE (MULTI): Status: RESOLVED | Noted: 2023-09-13 | Resolved: 2024-07-06

## 2024-07-06 PROBLEM — K29.70 GASTRITIS: Status: RESOLVED | Noted: 2023-09-13 | Resolved: 2024-07-06

## 2024-07-12 LAB

## 2024-08-06 ENCOUNTER — APPOINTMENT (OUTPATIENT)
Dept: PRIMARY CARE | Facility: CLINIC | Age: 69
End: 2024-08-06
Payer: MEDICARE

## 2024-08-06 ENCOUNTER — TELEPHONE (OUTPATIENT)
Dept: PRIMARY CARE | Facility: CLINIC | Age: 69
End: 2024-08-06

## 2024-08-06 VITALS
OXYGEN SATURATION: 98 % | DIASTOLIC BLOOD PRESSURE: 70 MMHG | SYSTOLIC BLOOD PRESSURE: 118 MMHG | HEART RATE: 58 BPM | TEMPERATURE: 97.9 F

## 2024-08-06 DIAGNOSIS — G25.0 BENIGN ESSENTIAL TREMOR: ICD-10-CM

## 2024-08-06 DIAGNOSIS — F32.A DEPRESSIVE DISORDER: Primary | ICD-10-CM

## 2024-08-06 DIAGNOSIS — Z00.00 ROUTINE GENERAL MEDICAL EXAMINATION AT HEALTH CARE FACILITY: Primary | ICD-10-CM

## 2024-08-06 DIAGNOSIS — E78.49 OTHER HYPERLIPIDEMIA: ICD-10-CM

## 2024-08-06 DIAGNOSIS — I10 BENIGN ESSENTIAL HTN: ICD-10-CM

## 2024-08-06 PROCEDURE — 1159F MED LIST DOCD IN RCRD: CPT

## 2024-08-06 PROCEDURE — 1158F ADVNC CARE PLAN TLK DOCD: CPT

## 2024-08-06 PROCEDURE — 1125F AMNT PAIN NOTED PAIN PRSNT: CPT

## 2024-08-06 PROCEDURE — 1160F RVW MEDS BY RX/DR IN RCRD: CPT

## 2024-08-06 PROCEDURE — 1123F ACP DISCUSS/DSCN MKR DOCD: CPT

## 2024-08-06 PROCEDURE — 99213 OFFICE O/P EST LOW 20 MIN: CPT

## 2024-08-06 RX ORDER — EZETIMIBE 10 MG/1
10 TABLET ORAL DAILY
Qty: 30 TABLET | Refills: 5 | Status: SHIPPED | OUTPATIENT
Start: 2024-08-06 | End: 2025-02-02

## 2024-08-06 RX ORDER — SERTRALINE HYDROCHLORIDE 100 MG/1
100 TABLET, FILM COATED ORAL DAILY
Qty: 90 TABLET | Refills: 0 | Status: SHIPPED | OUTPATIENT
Start: 2024-08-06 | End: 2024-11-04

## 2024-08-06 ASSESSMENT — PAIN SCALES - GENERAL: PAINLEVEL: 3

## 2024-08-06 ASSESSMENT — PATIENT HEALTH QUESTIONNAIRE - PHQ9
SUM OF ALL RESPONSES TO PHQ9 QUESTIONS 1 AND 2: 0
2. FEELING DOWN, DEPRESSED OR HOPELESS: NOT AT ALL
1. LITTLE INTEREST OR PLEASURE IN DOING THINGS: NOT AT ALL

## 2024-08-06 NOTE — PROGRESS NOTES
"Subjective     Patient ID: Ambar Marsh \"Belinda\" is a 68 y.o. female who presents for Follow-up.      HPI  Belinda presents for depression follow up. Increase zoloft to 100 mg daily at last visit due to increased feelings of down, hopeless and more tearful throughout her day. She admits her symptoms are much improved at this visit.     She also has concerns today for progressing essential tremor, which has been a chronic issue for her but was previously manageable.  She reports lately her tremors are much more noticeable.      Patient's recent visit notes, medication and allergy lists, past medical surgical social hx, immunization, vitals, problem list, recent tests were reviewed by me for pertinence to this visit.    Current Outpatient Medications:     ezetimibe (Zetia) 10 mg tablet, Take 1 tablet (10 mg) by mouth once daily., Disp: 30 tablet, Rfl: 5    methocarbamol (Robaxin) 750 mg tablet, Take 1 tablet (750 mg) by mouth 3 times a day as needed for muscle spasms., Disp: 90 tablet, Rfl: 5    sertraline (Zoloft) 100 mg tablet, Take 1 tablet (100 mg) by mouth once daily., Disp: 90 tablet, Rfl: 0    traMADol (Ultram) 50 mg tablet, Take 1 tablet (50 mg) by mouth every 6 hours if needed for severe pain (7 - 10)., Disp: 120 tablet, Rfl: 0    valACYclovir (Valtrex) 500 mg tablet, Take 1 tablet (500 mg) by mouth once daily., Disp: 30 tablet, Rfl: 1      Review of Systems  All other systems have been reviewed and are negative except as noted in the HPI.         Objective   /70 (BP Location: Left arm)   Pulse 58   Temp 36.6 °C (97.9 °F) (Temporal)   SpO2 98%       Physical Exam  Vitals and nursing note reviewed.   Constitutional:       General: She is not in acute distress.     Appearance: Normal appearance.   Neck:      Vascular: No carotid bruit.   Cardiovascular:      Rate and Rhythm: Normal rate and regular rhythm.      Pulses: Normal pulses.      Heart sounds: Normal heart sounds, S1 normal and S2 normal. No " murmur heard.  Pulmonary:      Effort: Pulmonary effort is normal.      Breath sounds: Normal breath sounds and air entry.   Musculoskeletal:      Cervical back: Normal range of motion and neck supple.      Right lower leg: No edema.      Left lower leg: No edema.   Lymphadenopathy:      Cervical: No cervical adenopathy.   Skin:     General: Skin is warm and dry.   Neurological:      General: No focal deficit present.      Mental Status: She is alert. Mental status is at baseline.   Psychiatric:         Behavior: Behavior is cooperative.             Assessment & Plan  Depressive disorder  Chronic condition, much improved today   Continue sertraline 100 mg by mouth daily.  Follow up in 6 months or sooner if symptoms seem to worsen  Orders:    sertraline (Zoloft) 100 mg tablet; Take 1 tablet (100 mg) by mouth once daily.    Benign essential tremor  Follow up with neurology as discussed  Orders:    Referral to Neurology; Future          Patient understands and agrees with treatment plan.    Bre Fowler, APRN-CNP

## 2024-08-06 NOTE — ASSESSMENT & PLAN NOTE
Chronic condition, much improved today   Continue sertraline 100 mg by mouth daily.  Follow up in 6 months or sooner if symptoms seem to worsen  Orders:    sertraline (Zoloft) 100 mg tablet; Take 1 tablet (100 mg) by mouth once daily.

## 2024-09-16 ENCOUNTER — HOSPITAL ENCOUNTER (OUTPATIENT)
Dept: RADIOLOGY | Facility: CLINIC | Age: 69
Discharge: HOME | End: 2024-09-16
Payer: MEDICARE

## 2024-09-16 ENCOUNTER — APPOINTMENT (OUTPATIENT)
Dept: RADIOLOGY | Facility: CLINIC | Age: 69
End: 2024-09-16
Payer: MEDICARE

## 2024-09-16 DIAGNOSIS — M41.25 OTHER IDIOPATHIC SCOLIOSIS, THORACOLUMBAR REGION: ICD-10-CM

## 2024-09-16 PROCEDURE — 72141 MRI NECK SPINE W/O DYE: CPT | Performed by: STUDENT IN AN ORGANIZED HEALTH CARE EDUCATION/TRAINING PROGRAM

## 2024-09-16 PROCEDURE — 72141 MRI NECK SPINE W/O DYE: CPT

## 2024-09-16 PROCEDURE — 72146 MRI CHEST SPINE W/O DYE: CPT

## 2024-09-16 PROCEDURE — 72146 MRI CHEST SPINE W/O DYE: CPT | Performed by: STUDENT IN AN ORGANIZED HEALTH CARE EDUCATION/TRAINING PROGRAM

## 2024-09-23 ENCOUNTER — APPOINTMENT (OUTPATIENT)
Dept: RADIOLOGY | Facility: CLINIC | Age: 69
End: 2024-09-23
Payer: MEDICARE

## 2024-09-30 ENCOUNTER — APPOINTMENT (OUTPATIENT)
Dept: PAIN MEDICINE | Facility: CLINIC | Age: 69
End: 2024-09-30
Payer: MEDICARE

## 2024-10-14 ENCOUNTER — OFFICE VISIT (OUTPATIENT)
Dept: PAIN MEDICINE | Facility: CLINIC | Age: 69
End: 2024-10-14
Payer: MEDICARE

## 2024-10-14 ENCOUNTER — HOSPITAL ENCOUNTER (OUTPATIENT)
Dept: RADIOLOGY | Facility: CLINIC | Age: 69
Discharge: HOME | End: 2024-10-14
Payer: MEDICARE

## 2024-10-14 VITALS
RESPIRATION RATE: 20 BRPM | HEART RATE: 58 BPM | HEIGHT: 64 IN | BODY MASS INDEX: 22.71 KG/M2 | DIASTOLIC BLOOD PRESSURE: 75 MMHG | WEIGHT: 133 LBS | SYSTOLIC BLOOD PRESSURE: 118 MMHG | OXYGEN SATURATION: 97 %

## 2024-10-14 DIAGNOSIS — M54.16 CHRONIC LUMBAR RADICULOPATHY: ICD-10-CM

## 2024-10-14 DIAGNOSIS — Z79.891 LONG TERM PRESCRIPTION OPIATE USE: ICD-10-CM

## 2024-10-14 DIAGNOSIS — M25.551 BILATERAL HIP PAIN: Primary | ICD-10-CM

## 2024-10-14 DIAGNOSIS — M25.552 BILATERAL HIP PAIN: Primary | ICD-10-CM

## 2024-10-14 DIAGNOSIS — M54.30 SCIATIC LEG PAIN: ICD-10-CM

## 2024-10-14 PROCEDURE — 99214 OFFICE O/P EST MOD 30 MIN: CPT | Performed by: ANESTHESIOLOGY

## 2024-10-14 PROCEDURE — 73521 X-RAY EXAM HIPS BI 2 VIEWS: CPT

## 2024-10-14 PROCEDURE — 82570 ASSAY OF URINE CREATININE: CPT | Mod: WESLAB | Performed by: ANESTHESIOLOGY

## 2024-10-14 PROCEDURE — 80346 BENZODIAZEPINES1-12: CPT | Mod: WESLAB | Performed by: ANESTHESIOLOGY

## 2024-10-14 PROCEDURE — 80373 DRUG SCREENING TRAMADOL: CPT | Mod: WESLAB,MUE | Performed by: ANESTHESIOLOGY

## 2024-10-14 RX ORDER — TRAMADOL HYDROCHLORIDE 50 MG/1
50 TABLET ORAL 2 TIMES DAILY PRN
Qty: 60 TABLET | Refills: 0 | Status: SHIPPED | OUTPATIENT
Start: 2024-10-14 | End: 2024-11-13

## 2024-10-14 RX ORDER — NALOXONE HYDROCHLORIDE 4 MG/.1ML
1 SPRAY NASAL AS NEEDED
Qty: 2 EACH | Refills: 0 | Status: SHIPPED | OUTPATIENT
Start: 2024-10-14

## 2024-10-14 RX ORDER — METHOCARBAMOL 750 MG/1
750 TABLET, FILM COATED ORAL DAILY PRN
Qty: 90 TABLET | Refills: 0 | Status: SHIPPED | OUTPATIENT
Start: 2024-10-14 | End: 2025-01-12

## 2024-10-14 RX ORDER — PRIMIDONE 50 MG/1
50 TABLET ORAL 4 TIMES DAILY
COMMUNITY

## 2024-10-14 ASSESSMENT — ENCOUNTER SYMPTOMS
BACK PAIN: 1
ENDOCRINE NEGATIVE: 1
CARDIOVASCULAR NEGATIVE: 1
RESPIRATORY NEGATIVE: 1
EYES NEGATIVE: 1
GASTROINTESTINAL NEGATIVE: 1
CONSTITUTIONAL NEGATIVE: 1
HEMATOLOGIC/LYMPHATIC NEGATIVE: 1
NEUROLOGICAL NEGATIVE: 1
PSYCHIATRIC NEGATIVE: 1

## 2024-10-14 ASSESSMENT — PAIN SCALES - GENERAL
PAINLEVEL_OUTOF10: 8
PAINLEVEL: 8

## 2024-10-14 ASSESSMENT — PATIENT HEALTH QUESTIONNAIRE - PHQ9
9. THOUGHTS THAT YOU WOULD BE BETTER OFF DEAD, OR OF HURTING YOURSELF: NOT AT ALL
10. IF YOU CHECKED OFF ANY PROBLEMS, HOW DIFFICULT HAVE THESE PROBLEMS MADE IT FOR YOU TO DO YOUR WORK, TAKE CARE OF THINGS AT HOME, OR GET ALONG WITH OTHER PEOPLE: SOMEWHAT DIFFICULT
1. LITTLE INTEREST OR PLEASURE IN DOING THINGS: NEARLY EVERY DAY
2. FEELING DOWN, DEPRESSED OR HOPELESS: NEARLY EVERY DAY
3. TROUBLE FALLING OR STAYING ASLEEP OR SLEEPING TOO MUCH: NOT AT ALL
8. MOVING OR SPEAKING SO SLOWLY THAT OTHER PEOPLE COULD HAVE NOTICED. OR THE OPPOSITE, BEING SO FIGETY OR RESTLESS THAT YOU HAVE BEEN MOVING AROUND A LOT MORE THAN USUAL: NOT AT ALL
6. FEELING BAD ABOUT YOURSELF - OR THAT YOU ARE A FAILURE OR HAVE LET YOURSELF OR YOUR FAMILY DOWN: NOT AT ALL
5. POOR APPETITE OR OVEREATING: NOT AT ALL
SUM OF ALL RESPONSES TO PHQ9 QUESTIONS 1 AND 2: 6
SUM OF ALL RESPONSES TO PHQ QUESTIONS 1-9: 8
4. FEELING TIRED OR HAVING LITTLE ENERGY: MORE THAN HALF THE DAYS
7. TROUBLE CONCENTRATING ON THINGS, SUCH AS READING THE NEWSPAPER OR WATCHING TELEVISION: NOT AT ALL

## 2024-10-14 ASSESSMENT — LIFESTYLE VARIABLES: TOTAL SCORE: 5

## 2024-10-14 ASSESSMENT — PAIN DESCRIPTION - DESCRIPTORS: DESCRIPTORS: ACHING;STABBING

## 2024-10-14 ASSESSMENT — PAIN - FUNCTIONAL ASSESSMENT: PAIN_FUNCTIONAL_ASSESSMENT: 0-10

## 2024-10-14 NOTE — PROGRESS NOTES
PAIN MANAGEMENT NEW PATIENT OFFICE NOTE    Date of Service: 10/14/2024    SUBJECTIVE    CHIEF COMPLAINT: LBP    HISTORY OF PRESENT ILLNESS    Ambar Marsh is a 69 y.o. female with PMH MDD, former smoker, OA, marijuana use, ET who presents as new patient referred by DEVON Jama-CNP with LB pain.    Pt describes LBP pain since 2015. Pain is focal to L side and radiates up to L flank and down BL hips to groins and to lateral BL feet.  On 6/26, pt underwent L4-5 ILESI with Dr Bhatti with improved leg pain. Pt does not, however, wish to go back. Pain is worse with climbing stairs, lifting, riding bicycle. Pain is alleviated with tramadol.     Current tx: tramadol 50 mgt QID Prn, Robaxin    Past tx: Tylenol, NSAIDs, TENS, Epsom salt, acupuncture, >6 w chiro, massage, SSRI, >6 w PT, SELMA, marijuana      Pt denies new-onset numbness, weakness, bowel/bladder incontinence.  Pt denies recent infection, allergy to Latex/iodine/contrast. Patient is currently taking the following blood thinner(s): N/A    Procedure log:  -L4-5 ILESI 6/26/24 w/ Dr Bhatti: improved radicular pain    REVIEW OF SYSTEMS  Review of Systems   Constitutional: Negative.    HENT: Negative.     Eyes: Negative.    Respiratory: Negative.     Cardiovascular: Negative.    Gastrointestinal: Negative.    Endocrine: Negative.    Musculoskeletal:  Positive for back pain.   Skin: Negative.    Neurological: Negative.    Hematological: Negative.    Psychiatric/Behavioral: Negative.         PAST MEDICAL HISTORY  Past Medical History:   Diagnosis Date    Abnormal Pap smear of cervix     Acute sinusitis, unspecified 01/29/2019    Acute non-recurrent sinusitis, unspecified location    Body mass index (BMI) 21.0-21.9, adult 02/25/2020    BMI 21.0-21.9, adult    Body mass index (BMI) 22.0-22.9, adult 01/14/2021    BMI 22.0-22.9, adult    Body mass index (BMI) 22.0-22.9, adult     BMI 22.0-22.9, adult    Body mass index (BMI) 22.0-22.9, adult 10/21/2021     BMI 22.0-22.9, adult    Body mass index (BMI) 23.0-23.9, adult     BMI 23.0-23.9, adult    Body mass index (BMI) 23.0-23.9, adult 08/05/2021    BMI 23.0-23.9, adult    Body mass index (BMI) 23.0-23.9, adult 09/07/2021    BMI 23.0-23.9, adult    Dependent relative needing care at home 06/27/2019    Caregiver stress    Diarrhea, unspecified 10/26/2021    Acute diarrhea    Disease of spinal cord, unspecified 08/05/2021    Lumbar myelopathy    Encounter for screening for infections with a predominantly sexual mode of transmission 12/08/2016    Screen for STD (sexually transmitted disease)    Epigastric pain 12/05/2018    Abdominal pain, chronic, epigastric    Gastritis 09/13/2023    Herpes 2022    HPV (human papilloma virus) infection 2022    Medial epicondylitis, right elbow 01/14/2021    Medial epicondylitis of right elbow    Migraine without aura, intractable, with status migrainosus 10/02/2019    Intractable migraine without aura and with status migrainosus    Other conditions influencing health status 12/05/2018    Chronic migraine    Other forms of dyspnea 05/25/2016    Dyspnea on exertion    Pain in right elbow     Right elbow pain    Pain in right knee     Knee pain, right    Pain in right wrist     Right wrist pain    Pain in unspecified hand 03/15/2016    Pain of hand    Pain in unspecified knee     Knee pain    Partial obstruction of small intestine (Multi) 09/13/2023    Personal history of other diseases of the digestive system 01/29/2019    History of small bowel obstruction    Personal history of other diseases of the musculoskeletal system and connective tissue 05/04/2017    History of neck pain    Personal history of other endocrine, nutritional and metabolic disease 05/04/2017    History of hyperglycemia    Personal history of other medical treatment 05/25/2016    History of screening mammography    Personal history of other mental and behavioral disorders 02/25/2020    History of anxiety    Personal  history of other specified conditions 2021    History of confusion    Personal history of other specified conditions 2016    History of chronic cough    Personal history of other specified conditions 2020    History of chest pain at rest    Personal history of other specified conditions 10/21/2021    History of diarrhea    Radiculopathy, cervical region 2021    Cervical radiculopathy at C5    Sciatica, left side 2020    Sciatica of left side    Unspecified abdominal pain 10/21/2021    Pain, abdominal, nonspecific    Unspecified abdominal pain 10/26/2021    Abdominal pain in female patient    Urinary tract infection, site not specified 2021    Acute UTI     Past Surgical History:   Procedure Laterality Date    APPENDECTOMY  2016    Appendectomy     SECTION, LOW TRANSVERSE  3/20/1987    COLPOSCOPY      LASIK  2004    MYOMECTOMY  1975     Family History   Problem Relation Name Age of Onset    Lung cancer Mother  Moviel     Breast cancer Mother  Moviel     Arthritis Mother  Moviel     Heart disease Father Seth GLORIA MOVIEL     Alcohol abuse Sister Etta     Diabetes Brother Dead     Hypertension Brother Dead     Hypertension Brother Kalen Moviel     Gout Brother Kalen Movie     Throat cancer Brother Kalen Moviel     Diabetes Maternal Grandmother      Breast cancer Paternal Grandmother Marisol Moviel        CURRENT MEDICATIONS  Current Outpatient Medications   Medication Sig Dispense Refill    ezetimibe (Zetia) 10 mg tablet Take 1 tablet (10 mg) by mouth once daily. 30 tablet 5    methocarbamol (Robaxin) 750 mg tablet Take 1 tablet (750 mg) by mouth 3 times a day as needed for muscle spasms. 90 tablet 5    sertraline (Zoloft) 100 mg tablet Take 1 tablet (100 mg) by mouth once daily. 90 tablet 0    traMADol (Ultram) 50 mg tablet Take 1 tablet (50 mg) by mouth every 6 hours if needed for severe pain (7 - 10). 120 tablet 0    valACYclovir (Valtrex) 500 mg  "tablet Take 1 tablet (500 mg) by mouth once daily. 30 tablet 1    primidone (Mysoline) 50 mg tablet Take 1 tablet (50 mg) by mouth 4 times a day.       No current facility-administered medications for this visit.       ALLERGIES AND DRUG REACTIONS  Allergies   Allergen Reactions    Codeine Itching    Ibuprofen Unknown    Levofloxacin Other    Prednisone Other    Statins-Hmg-Coa Reductase Inhibitors Other    Sulfamethoxazole Hives          OBJECTIVE  Visit Vitals  /75   Pulse 58   Resp 20   Ht 1.626 m (5' 4\")   Wt 60.3 kg (133 lb)   SpO2 97%   BMI 22.83 kg/m²   OB Status Postmenopausal   Smoking Status Former   BSA 1.65 m²       Last Recorded Pain Score (if available):                Physical Exam  General: Sitting in chair, NAD  Head: NCAT  Eyes: Sclera/conjunctiva clear, EOMI, PERRL  Nose/mouth: MMM  CV: Good distal pulses  Lungs: Good/equal chest excursion  Abdomen: Soft, ND  Ext: No cyanosis/edema  MSK: L-spine alignment: unremarkable, BL paraspinal m TTP, L-spine ROM: pain on flexion/extension  BL hips: GTB NTTP, no leg-length discrepancy. Neg log roll. Pain on internal rotation BL    Neuro: AAOx3, CN grossly nl   Dermatome sensation to light touch  LEFT L1 (lower pelvis/upper thigh): WNL    RIGHT L1: WNL      LEFT L2 (upper thigh): WNL       RIGHT: L2:WNL      LEFT L3 (medial knee): WNL       RIGHT L3: WNL      LEFT L4 (superior medial malleolus): WNL       RIGHT L4: WNL      LEFT L5 (dorsal foot): WNL       RIGHT L5: WNL      LEFT S1 (lateral foot): WNL     RIGHT S1: WNL      LEFT S2 (popliteal fossa): WNL    RIGHT S2: WNL        Motor strength  LEFT L2 (hip flexion): 5/5   RIGHT L2: 5/5  LEFT L3 (knee extension): 5/5     RIGHT L3: 5/5  LEFT L4 (dorsiflexion): 5/5     RIGHT L4: 5/5  LEFT L5 (EHL extension): 5/5     RIGHT L5: 5/5  LEFT S1 (plantarflexion): 5/5     RIGHT S1: 5/5  LEFT S2 (knee flexion): 5/5      RIGHT S2: 5/5    Special testing  DTR unremarkable  Seated slump test neg BL  Clonus: neg " "BL  Babinski: neg BL    Psych: affect nl  Skin: no rash/lesions      REVIEW OF LABORATORY DATA  I have reviewed the following lab results:  WBC   Date Value Ref Range Status   04/08/2024 9.6 4.4 - 11.3 x10*3/uL Final     RBC   Date Value Ref Range Status   04/08/2024 4.79 4.00 - 5.20 x10*6/uL Final     Hemoglobin   Date Value Ref Range Status   04/08/2024 14.6 12.0 - 16.0 g/dL Final     Hematocrit   Date Value Ref Range Status   04/08/2024 43.9 36.0 - 46.0 % Final     MCV   Date Value Ref Range Status   04/08/2024 92 80 - 100 fL Final     MCH   Date Value Ref Range Status   04/08/2024 30.5 26.0 - 34.0 pg Final     MCHC   Date Value Ref Range Status   04/08/2024 33.3 32.0 - 36.0 g/dL Final     RDW   Date Value Ref Range Status   04/08/2024 12.1 11.5 - 14.5 % Final     Platelets   Date Value Ref Range Status   04/08/2024 309 150 - 450 x10*3/uL Final     MPV   Date Value Ref Range Status   03/30/2021 10.4 7.0 - 12.6 CU Final     Sodium   Date Value Ref Range Status   04/08/2024 140 136 - 145 mmol/L Final     Potassium   Date Value Ref Range Status   04/08/2024 4.5 3.5 - 5.3 mmol/L Final     Bicarbonate   Date Value Ref Range Status   04/08/2024 29 21 - 32 mmol/L Final     Urea Nitrogen   Date Value Ref Range Status   04/08/2024 17 6 - 23 mg/dL Final     Calcium   Date Value Ref Range Status   04/08/2024 10.1 8.6 - 10.6 mg/dL Final     No results found for: \"PROTIME\", \"PTT\", \"INR\", \"FIBRINOGEN\"      REVIEW OF RADIOLOGY   I have reviewed the following:  Radiology Studies           MRI L-spine 8/19/21:  Neural foraminal narrowing at L4/L5 to the left.        ASSESSMENT & PLAN  Ambar Marsh is a 69 y.o. female with PMH MDD, former smoker, OA, marijuana use, ET who presents as new patient referred by Bre A Janeth, APRN-CNP with LB pain    1) LBP  -Since 2015 with radiation to BL hips/groins and occasionally to lateral feet w/o obj deficit  -Refractive to yrs of conservative tx including Tylenol, NSAIDs, TENS, Epsom " salt, acupuncture, >6 w chiro, massage, SSRI, >6 w PT, SELMA, marijuana,  -Reviewed/discussed MRI L-spine 8/19/21: left L4-5 NFS  -Order new MRI L-spine to eval progression in neuraxial pathology  -XR Bl hips to r/o OA given PE provocation  -Cont PRN Robaxin  -See opioid mgmt below    2) Opioid mgmt  -2/2 chronic LBP refractive to yrs of conservative, interventional tx  -Pt suffers from chronic pain as detailed above that has impacted patient's ability to work and/or complete ADLs/function and maintain quality of life. Discussed that in order to assume opiate prescription safely, need to sign pain agreement.  Patient is to be is prescribed the minimal effective dose and to bee seen on a regular basis for refills and dose adjustments. Reviewed risks and mitigation factors related to opioid prescribing including common SE, withdrawal, OUD, potentially fatal respiratory depression. Reminded patient these are the reasons medication cannot be self-titrated and thus, must be taken as prescribed and not with other controlled substances (pt will immediately DC marijuana) unless otherwise instructed.  Patient will be subject to random drug testing- UDS today.  Opioids may impact ability to operate vehicle/heavy machinery and may affect work toxicology. Patient does not use other controlled substances or alcohol on an outpatient basis in combination with this prescription.  Discussed safe storage of opioids and importance to bring bottle to appointments for potential pill count. All questions answered and patient agrees.  reviewed/appropriate. Pain agreement signed.  -Tramadol 50 mg BID PRN x30 d. Educated on/prescribed Narcan in context of Robaxin co-use and former marijuana use   -F/U 1 mo               Jennifer Nash MD  Anesthesiologist & Interventional Pain Physician   [facility] [place]  O: [phone]  F: [phone]  12:18 PM  10/14/24

## 2024-10-15 LAB
AMPHETAMINES UR QL SCN: NORMAL
BARBITURATES UR QL SCN: NORMAL
BZE UR QL SCN: NORMAL
CANNABINOIDS UR QL SCN: NORMAL
CREAT UR-MCNC: 131.4 MG/DL (ref 20–320)
PCP UR QL SCN: NORMAL

## 2024-10-16 ENCOUNTER — TELEPHONE (OUTPATIENT)
Dept: PAIN MEDICINE | Facility: CLINIC | Age: 69
End: 2024-10-16
Payer: MEDICARE

## 2024-10-16 NOTE — TELEPHONE ENCOUNTER
Pt. Called requestig MRI to be faxed to [facility]. RN faxed order and called and made pt. Aware order was faxed.

## 2024-10-18 LAB
1OH-MIDAZOLAM UR CFM-MCNC: <25 NG/ML
6MAM UR CFM-MCNC: <25 NG/ML
7AMINOCLONAZEPAM UR CFM-MCNC: <25 NG/ML
A-OH ALPRAZ UR CFM-MCNC: <25 NG/ML
ALPRAZ UR CFM-MCNC: <25 NG/ML
CHLORDIAZEP UR CFM-MCNC: <25 NG/ML
CLONAZEPAM UR CFM-MCNC: <25 NG/ML
CODEINE UR CFM-MCNC: <50 NG/ML
DIAZEPAM UR CFM-MCNC: <25 NG/ML
EDDP UR CFM-MCNC: <25 NG/ML
FENTANYL UR CFM-MCNC: <2.5 NG/ML
HYDROCODONE CTO UR CFM-MCNC: <25 NG/ML
HYDROMORPHONE UR CFM-MCNC: <25 NG/ML
LORAZEPAM UR CFM-MCNC: <25 NG/ML
METHADONE UR CFM-MCNC: <25 NG/ML
MIDAZOLAM UR CFM-MCNC: <25 NG/ML
MORPHINE UR CFM-MCNC: <50 NG/ML
NORDIAZEPAM UR CFM-MCNC: <25 NG/ML
NORFENTANYL UR CFM-MCNC: <2.5 NG/ML
NORHYDROCODONE UR CFM-MCNC: <25 NG/ML
NOROXYCODONE UR CFM-MCNC: <25 NG/ML
NORTRAMADOL UR-MCNC: >1000 NG/ML
OXAZEPAM UR CFM-MCNC: <25 NG/ML
OXYCODONE UR CFM-MCNC: <25 NG/ML
OXYMORPHONE UR CFM-MCNC: <25 NG/ML
TEMAZEPAM UR CFM-MCNC: <25 NG/ML
TRAMADOL UR CFM-MCNC: >1000 NG/ML
ZOLPIDEM UR CFM-MCNC: <25 NG/ML
ZOLPIDEM UR-MCNC: <25 NG/ML

## 2024-10-28 ENCOUNTER — APPOINTMENT (OUTPATIENT)
Dept: NEUROLOGY | Facility: HOSPITAL | Age: 69
End: 2024-10-28
Payer: MEDICARE

## 2024-10-29 ENCOUNTER — APPOINTMENT (OUTPATIENT)
Dept: PAIN MEDICINE | Facility: CLINIC | Age: 69
End: 2024-10-29
Payer: MEDICARE

## 2024-10-29 ENCOUNTER — APPOINTMENT (OUTPATIENT)
Dept: RADIOLOGY | Facility: CLINIC | Age: 69
End: 2024-10-29
Payer: MEDICARE

## 2024-10-29 ENCOUNTER — TELEPHONE (OUTPATIENT)
Dept: PRIMARY CARE | Facility: CLINIC | Age: 69
End: 2024-10-29

## 2024-10-29 DIAGNOSIS — Z87.891 FORMER CIGARETTE SMOKER: ICD-10-CM

## 2024-10-29 DIAGNOSIS — Z87.891 STOPPED SMOKING WITH GREATER THAN 20 PACK YEAR HISTORY: ICD-10-CM

## 2024-10-29 DIAGNOSIS — Z12.2 SCREENING FOR LUNG CANCER: Primary | ICD-10-CM

## 2024-11-07 ENCOUNTER — APPOINTMENT (OUTPATIENT)
Dept: NEUROLOGY | Facility: HOSPITAL | Age: 69
End: 2024-11-07
Payer: MEDICARE

## 2024-11-11 ENCOUNTER — OFFICE VISIT (OUTPATIENT)
Dept: PAIN MEDICINE | Facility: CLINIC | Age: 69
End: 2024-11-11
Payer: MEDICARE

## 2024-11-11 VITALS
HEART RATE: 51 BPM | HEIGHT: 64 IN | WEIGHT: 133 LBS | OXYGEN SATURATION: 98 % | BODY MASS INDEX: 22.71 KG/M2 | DIASTOLIC BLOOD PRESSURE: 78 MMHG | RESPIRATION RATE: 16 BRPM | SYSTOLIC BLOOD PRESSURE: 122 MMHG

## 2024-11-11 DIAGNOSIS — M54.30 SCIATIC LEG PAIN: ICD-10-CM

## 2024-11-11 DIAGNOSIS — G89.29 CHRONIC BILATERAL LOW BACK PAIN WITH BILATERAL SCIATICA: Primary | ICD-10-CM

## 2024-11-11 DIAGNOSIS — M54.41 CHRONIC BILATERAL LOW BACK PAIN WITH BILATERAL SCIATICA: Primary | ICD-10-CM

## 2024-11-11 DIAGNOSIS — Z79.891 LONG TERM (CURRENT) USE OF OPIATE ANALGESIC: ICD-10-CM

## 2024-11-11 DIAGNOSIS — M54.42 CHRONIC BILATERAL LOW BACK PAIN WITH BILATERAL SCIATICA: Primary | ICD-10-CM

## 2024-11-11 PROCEDURE — 1123F ACP DISCUSS/DSCN MKR DOCD: CPT | Performed by: ANESTHESIOLOGY

## 2024-11-11 PROCEDURE — 99214 OFFICE O/P EST MOD 30 MIN: CPT | Performed by: ANESTHESIOLOGY

## 2024-11-11 PROCEDURE — 3008F BODY MASS INDEX DOCD: CPT | Performed by: ANESTHESIOLOGY

## 2024-11-11 PROCEDURE — 99417 PROLNG OP E/M EACH 15 MIN: CPT | Performed by: ANESTHESIOLOGY

## 2024-11-11 PROCEDURE — 1160F RVW MEDS BY RX/DR IN RCRD: CPT | Performed by: ANESTHESIOLOGY

## 2024-11-11 PROCEDURE — 1159F MED LIST DOCD IN RCRD: CPT | Performed by: ANESTHESIOLOGY

## 2024-11-11 PROCEDURE — 1125F AMNT PAIN NOTED PAIN PRSNT: CPT | Performed by: ANESTHESIOLOGY

## 2024-11-11 RX ORDER — TRAMADOL HYDROCHLORIDE 50 MG/1
50 TABLET ORAL 2 TIMES DAILY PRN
Qty: 60 TABLET | Refills: 2 | Status: SHIPPED | OUTPATIENT
Start: 2024-11-13 | End: 2025-02-11

## 2024-11-11 ASSESSMENT — PAIN DESCRIPTION - DESCRIPTORS: DESCRIPTORS: ACHING;SHOOTING;SPASM

## 2024-11-11 ASSESSMENT — ENCOUNTER SYMPTOMS
GASTROINTESTINAL NEGATIVE: 1
CARDIOVASCULAR NEGATIVE: 1
EYES NEGATIVE: 1
HEMATOLOGIC/LYMPHATIC NEGATIVE: 1
CONSTITUTIONAL NEGATIVE: 1
PSYCHIATRIC NEGATIVE: 1
RESPIRATORY NEGATIVE: 1
NEUROLOGICAL NEGATIVE: 1
BACK PAIN: 1
ENDOCRINE NEGATIVE: 1

## 2024-11-11 ASSESSMENT — PAIN - FUNCTIONAL ASSESSMENT: PAIN_FUNCTIONAL_ASSESSMENT: 0-10

## 2024-11-11 ASSESSMENT — PAIN SCALES - GENERAL
PAINLEVEL_OUTOF10: 3
PAINLEVEL_OUTOF10: 3

## 2024-11-11 NOTE — PROGRESS NOTES
MEDICATION NAME: Tramadol  STRENGTH: 50 mg.  LAST FILL DATE: 10/14/2024  DATE LAST TAKEN:   QUANTITY FILLED: 60  QUANTITY REMAININ  COUNT COMPLETED BY: FAIZAN COLLINS RN and JOLANTA GONZALES MD      UDS LAST COMPLETED:   CONTROLLED SUBSTANCES AGREEMENT LAST SIGNED:   ORT LAST COMPLETED:  Modified Osw[employer] disability form filled out annually.

## 2024-11-11 NOTE — PROGRESS NOTES
PAIN MANAGEMENT FOLLOW-UP OFFICE NOTE    Date of Service: 11/11/2024    SUBJECTIVE    CHIEF COMPLAINT: LBP    HISTORY OF PRESENT ILLNESS    Ambar Marsh is a 69 y.o. female with PMH MDD, former smoker, OA, marijuana use, ET who presents for F/U.    Since her last visit, pt completed MRI L-spine at [facility], but did not bring disc. Taking tramadol 50 mg BID PRN with good relief. Denies SE. Requests RF.     Pt denies new-onset numbness, weakness, bowel/bladder incontinence.  Pt denies recent infection, allergy to Latex/iodine/contrast. Patient is currently taking the following blood thinner(s): N/A    Procedure log:  -L4-5 ILESI 6/26/24 w/ Dr Bhatti: improved radicular pain    REVIEW OF SYSTEMS  Review of Systems   Constitutional: Negative.    HENT: Negative.     Eyes: Negative.    Respiratory: Negative.     Cardiovascular: Negative.    Gastrointestinal: Negative.    Endocrine: Negative.    Musculoskeletal:  Positive for back pain.   Skin: Negative.    Neurological: Negative.    Hematological: Negative.    Psychiatric/Behavioral: Negative.         PAST MEDICAL HISTORY  Past Medical History:   Diagnosis Date    Abnormal Pap smear of cervix     Acute sinusitis, unspecified 01/29/2019    Acute non-recurrent sinusitis, unspecified location    Body mass index (BMI) 21.0-21.9, adult 02/25/2020    BMI 21.0-21.9, adult    Body mass index (BMI) 22.0-22.9, adult 01/14/2021    BMI 22.0-22.9, adult    Body mass index (BMI) 22.0-22.9, adult     BMI 22.0-22.9, adult    Body mass index (BMI) 22.0-22.9, adult 10/21/2021    BMI 22.0-22.9, adult    Body mass index (BMI) 23.0-23.9, adult     BMI 23.0-23.9, adult    Body mass index (BMI) 23.0-23.9, adult 08/05/2021    BMI 23.0-23.9, adult    Body mass index (BMI) 23.0-23.9, adult 09/07/2021    BMI 23.0-23.9, adult    Dependent relative needing care at home 06/27/2019    Caregiver stress    Diarrhea, unspecified 10/26/2021    Acute diarrhea    Disease of spinal cord, unspecified  08/05/2021    Lumbar myelopathy    Encounter for screening for infections with a predominantly sexual mode of transmission 12/08/2016    Screen for STD (sexually transmitted disease)    Epigastric pain 12/05/2018    Abdominal pain, chronic, epigastric    Gastritis 09/13/2023    Herpes 2022    HPV (human papilloma virus) infection 2022    Medial epicondylitis, right elbow 01/14/2021    Medial epicondylitis of right elbow    Migraine without aura, intractable, with status migrainosus 10/02/2019    Intractable migraine without aura and with status migrainosus    Other conditions influencing health status 12/05/2018    Chronic migraine    Other forms of dyspnea 05/25/2016    Dyspnea on exertion    Pain in right elbow     Right elbow pain    Pain in right knee     Knee pain, right    Pain in right wrist     Right wrist pain    Pain in unspecified hand 03/15/2016    Pain of hand    Pain in unspecified knee     Knee pain    Partial obstruction of small intestine (Multi) 09/13/2023    Personal history of other diseases of the digestive system 01/29/2019    History of small bowel obstruction    Personal history of other diseases of the musculoskeletal system and connective tissue 05/04/2017    History of neck pain    Personal history of other endocrine, nutritional and metabolic disease 05/04/2017    History of hyperglycemia    Personal history of other medical treatment 05/25/2016    History of screening mammography    Personal history of other mental and behavioral disorders 02/25/2020    History of anxiety    Personal history of other specified conditions 08/05/2021    History of confusion    Personal history of other specified conditions 05/25/2016    History of chronic cough    Personal history of other specified conditions 08/25/2020    History of chest pain at rest    Personal history of other specified conditions 10/21/2021    History of diarrhea    Radiculopathy, cervical region 08/12/2021    Cervical radiculopathy  at C5    Sciatica, left side 2020    Sciatica of left side    Unspecified abdominal pain 10/21/2021    Pain, abdominal, nonspecific    Unspecified abdominal pain 10/26/2021    Abdominal pain in female patient    Urinary tract infection, site not specified 2021    Acute UTI     Past Surgical History:   Procedure Laterality Date    APPENDECTOMY  2016    Appendectomy     SECTION, LOW TRANSVERSE  3/20/1987    COLPOSCOPY      LASIK  2004    MYOMECTOMY  1975     Family History   Problem Relation Name Age of Onset    Lung cancer Mother  Moviel     Breast cancer Mother  Moviel     Arthritis Mother  Moviel     Heart disease Father Seth GLORIA MOVIEL     Alcohol abuse Sister Etta     Diabetes Brother Dead     Hypertension Brother Dead     Hypertension Brother Kalen Moviel     Gout Brother Kalen Moviel     Throat cancer Brother Kalen Moviel     Diabetes Maternal Grandmother      Breast cancer Paternal Grandmother Marisol Moviel        CURRENT MEDICATIONS  Current Outpatient Medications   Medication Sig Dispense Refill    ezetimibe (Zetia) 10 mg tablet Take 1 tablet (10 mg) by mouth once daily. 30 tablet 5    methocarbamol (Robaxin) 750 mg tablet Take 1 tablet (750 mg) by mouth once daily as needed for muscle spasms. 90 tablet 0    naloxone (Narcan) 4 mg/0.1 mL nasal spray Administer 1 spray (4 mg) into affected nostril(s) if needed for opioid reversal. May repeat every 2-3 minutes if needed, alternating nostrils, until medical assistance becomes available. 2 each 0    sertraline (Zoloft) 100 mg tablet Take 1 tablet (100 mg) by mouth once daily. 90 tablet 0    traMADol (Ultram) 50 mg tablet Take 1 tablet (50 mg) by mouth 2 times a day as needed for severe pain (7 - 10). 60 tablet 0    valACYclovir (Valtrex) 500 mg tablet Take 1 tablet (500 mg) by mouth once daily. 30 tablet 1    primidone (Mysoline) 50 mg tablet Take 1 tablet (50 mg) by mouth 4 times a day. (Patient not taking: Reported  "on 11/11/2024)       No current facility-administered medications for this visit.       ALLERGIES AND DRUG REACTIONS  Allergies   Allergen Reactions    Codeine Itching    Ibuprofen Unknown    Levofloxacin Other    Prednisone Other    Statins-Hmg-Coa Reductase Inhibitors Other    Sulfamethoxazole Hives          OBJECTIVE  Visit Vitals  /78   Pulse 51   Resp 16   Ht 1.626 m (5' 4\")   Wt 60.3 kg (133 lb)   SpO2 98%   BMI 22.83 kg/m²   OB Status Postmenopausal   Smoking Status Former   BSA 1.65 m²       Last Recorded Pain Score (if available):                Physical Exam  General: Sitting in chair, NAD  Head: NCAT  Eyes: Sclera/conjunctiva clear, EOMI, PERRL  Nose/mouth: MMM  CV: Good distal pulses  Lungs: Good/equal chest excursion  Abdomen: Soft, ND  Ext: No cyanosis/edema  MSK: L-spine alignment: unremarkable, BL paraspinal m TTP, L-spine ROM: pain on flexion/extension    Neuro: AAOx3, CN grossly nl     Psych: affect nl  Skin: no rash/lesions      REVIEW OF LABORATORY DATA  I have reviewed the following lab results:  WBC   Date Value Ref Range Status   04/08/2024 9.6 4.4 - 11.3 x10*3/uL Final     RBC   Date Value Ref Range Status   04/08/2024 4.79 4.00 - 5.20 x10*6/uL Final     Hemoglobin   Date Value Ref Range Status   04/08/2024 14.6 12.0 - 16.0 g/dL Final     Hematocrit   Date Value Ref Range Status   04/08/2024 43.9 36.0 - 46.0 % Final     MCV   Date Value Ref Range Status   04/08/2024 92 80 - 100 fL Final     MCH   Date Value Ref Range Status   04/08/2024 30.5 26.0 - 34.0 pg Final     MCHC   Date Value Ref Range Status   04/08/2024 33.3 32.0 - 36.0 g/dL Final     RDW   Date Value Ref Range Status   04/08/2024 12.1 11.5 - 14.5 % Final     Platelets   Date Value Ref Range Status   04/08/2024 309 150 - 450 x10*3/uL Final     MPV   Date Value Ref Range Status   03/30/2021 10.4 7.0 - 12.6 CU Final     Sodium   Date Value Ref Range Status   04/08/2024 140 136 - 145 mmol/L Final     Potassium   Date Value Ref " "Range Status   04/08/2024 4.5 3.5 - 5.3 mmol/L Final     Bicarbonate   Date Value Ref Range Status   04/08/2024 29 21 - 32 mmol/L Final     Urea Nitrogen   Date Value Ref Range Status   04/08/2024 17 6 - 23 mg/dL Final     Calcium   Date Value Ref Range Status   04/08/2024 10.1 8.6 - 10.6 mg/dL Final     No results found for: \"PROTIME\", \"PTT\", \"INR\", \"FIBRINOGEN\"      REVIEW OF RADIOLOGY   I have reviewed the following:  Radiology Studies           MRI L-spine 8/19/21:  Neural foraminal narrowing at L4/L5 to the left.        ASSESSMENT & PLAN  Ambar Marsh is a 69 y.o. female with PMH MDD, former smoker, OA, marijuana use, ET who presents for F/U.    1) LBP  -Since 2015 with radiation to BL hips/groins and occasionally to lateral feet w/o obj deficit  -Refractive to yrs of conservative tx including Tylenol, NSAIDs, TENS, Epsom salt, acupuncture, >6 w chiro, massage, SSRI, >6 w PT, SELMA, marijuana,  -Reviewed/discussed XR Bl hips 10/14/24: Unremarkable  -Reviewed/discussed MRI L-spine 11/4/24: Mild lumbar degenerative changes with grade 1 anterolisthesis and mild spinal canal stenosis at L4-5. Obtain CD from [employer] to review images  -Cont PRN Robaxin  -See opioid mgmt below    2) Opioid mgmt  -2/2 chronic LBP refractive to yrs of conservative, interventional tx  -On tramadol 50 mg BID PRN with good relief. Denies SE. Requests RF  - reviewed/appropriate. Reviewed UDS 10/14/24: OK. No sign of aberrant behavior  -Educated on/prescribed Narcan 10/14/24 in context of Robaxin co-use and former marijuana use   -RF tramadol x30 d +2 RF  -F/U 3 mo               Jennifer Nash MD  Anesthesiologist & Interventional Pain Physician   [facility] [place]  O: [phone]  F: [phone]  2:01 PM  11/11/24    "

## 2024-11-12 PROBLEM — M54.30 SCIATIC LEG PAIN: Status: ACTIVE | Noted: 2024-11-12

## 2024-11-12 PROBLEM — M54.42 CHRONIC BILATERAL LOW BACK PAIN WITH BILATERAL SCIATICA: Status: ACTIVE | Noted: 2024-11-12

## 2024-11-12 PROBLEM — Z79.891 LONG TERM (CURRENT) USE OF OPIATE ANALGESIC: Status: ACTIVE | Noted: 2024-11-12

## 2024-11-12 PROBLEM — G89.29 CHRONIC BILATERAL LOW BACK PAIN WITH BILATERAL SCIATICA: Status: ACTIVE | Noted: 2024-11-12

## 2024-11-12 PROBLEM — M54.41 CHRONIC BILATERAL LOW BACK PAIN WITH BILATERAL SCIATICA: Status: ACTIVE | Noted: 2024-11-12

## 2024-11-15 ENCOUNTER — TELEPHONE (OUTPATIENT)
Dept: PAIN MEDICINE | Facility: CLINIC | Age: 69
End: 2024-11-15

## 2024-11-15 DIAGNOSIS — M54.30 SCIATIC LEG PAIN: ICD-10-CM

## 2024-11-15 NOTE — TELEPHONE ENCOUNTER
"Received call from the patient stating that there was an issue with the Tramadol script. Spoke with pharmacist and said that you checked a box \"JEANNE\", you need to uncheck that box in order for them to fill  the script. This has something to do with , if you need more clarification please call the  pharmacist [phone]. Thank you  "

## 2024-11-18 RX ORDER — TRAMADOL HYDROCHLORIDE 50 MG/1
50 TABLET ORAL 2 TIMES DAILY PRN
Qty: 60 TABLET | Refills: 2 | Status: SHIPPED | OUTPATIENT
Start: 2024-11-18 | End: 2025-02-16

## 2024-11-19 ENCOUNTER — OFFICE VISIT (OUTPATIENT)
Dept: NEUROLOGY | Facility: HOSPITAL | Age: 69
End: 2024-11-19
Payer: MEDICARE

## 2024-11-19 ENCOUNTER — APPOINTMENT (OUTPATIENT)
Dept: NEUROLOGY | Facility: HOSPITAL | Age: 69
End: 2024-11-19
Payer: MEDICARE

## 2024-11-19 VITALS
HEART RATE: 57 BPM | BODY MASS INDEX: 23.05 KG/M2 | TEMPERATURE: 97.1 F | WEIGHT: 135 LBS | RESPIRATION RATE: 18 BRPM | HEIGHT: 64 IN | DIASTOLIC BLOOD PRESSURE: 76 MMHG | SYSTOLIC BLOOD PRESSURE: 119 MMHG

## 2024-11-19 DIAGNOSIS — G25.0 ESSENTIAL TREMOR: Primary | ICD-10-CM

## 2024-11-19 PROCEDURE — 1123F ACP DISCUSS/DSCN MKR DOCD: CPT | Performed by: PSYCHIATRY & NEUROLOGY

## 2024-11-19 PROCEDURE — 99205 OFFICE O/P NEW HI 60 MIN: CPT | Performed by: PSYCHIATRY & NEUROLOGY

## 2024-11-19 PROCEDURE — 99215 OFFICE O/P EST HI 40 MIN: CPT | Mod: GC | Performed by: PSYCHIATRY & NEUROLOGY

## 2024-11-19 PROCEDURE — 1126F AMNT PAIN NOTED NONE PRSNT: CPT | Performed by: PSYCHIATRY & NEUROLOGY

## 2024-11-19 PROCEDURE — 1159F MED LIST DOCD IN RCRD: CPT | Performed by: PSYCHIATRY & NEUROLOGY

## 2024-11-19 PROCEDURE — 1036F TOBACCO NON-USER: CPT | Performed by: PSYCHIATRY & NEUROLOGY

## 2024-11-19 PROCEDURE — 3008F BODY MASS INDEX DOCD: CPT | Performed by: PSYCHIATRY & NEUROLOGY

## 2024-11-19 RX ORDER — PROPRANOLOL HYDROCHLORIDE 10 MG/1
10 TABLET ORAL DAILY PRN
Qty: 30 TABLET | Refills: 2 | Status: SHIPPED | OUTPATIENT
Start: 2024-11-19 | End: 2025-02-17

## 2024-11-19 ASSESSMENT — PAIN SCALES - GENERAL: PAINLEVEL_OUTOF10: 0-NO PAIN

## 2024-11-19 NOTE — PATIENT INSTRUCTIONS
Dear Ms. Marsh,    You were seen in  [facility] for your tremors. Your tremors are likely essential tremors. Your symptoms will likely get worse over time slowly.    To prevent progression of your symptoms, we recommend fast paced exercises such as stationary cycling (80 cycles per minute, 30 minutes per session, 3 times per week).    Regarding the Benfotamine supplement, this appears to be a Vitamin supplement that would be okay for you take, however there is little evidence that this will help your symptoms.     There are medications we may start as preventative to reduce your symptoms. For now we will give an as needed prescription of Propranolol.    Take 10mg Propranolol as needed on days you feel you have more anxiety.    Thank you for allowing us to care for you,   Neurology Team

## 2025-01-12 DIAGNOSIS — M54.30 SCIATIC LEG PAIN: ICD-10-CM

## 2025-01-12 DIAGNOSIS — F32.A DEPRESSIVE DISORDER: ICD-10-CM

## 2025-01-13 RX ORDER — SERTRALINE HYDROCHLORIDE 100 MG/1
100 TABLET, FILM COATED ORAL DAILY
Qty: 90 TABLET | Refills: 1 | Status: SHIPPED | OUTPATIENT
Start: 2025-01-13

## 2025-01-14 RX ORDER — METHOCARBAMOL 750 MG/1
750 TABLET, FILM COATED ORAL DAILY PRN
Qty: 90 TABLET | Refills: 0 | Status: SHIPPED | OUTPATIENT
Start: 2025-01-14

## 2025-01-16 ENCOUNTER — APPOINTMENT (OUTPATIENT)
Dept: NEUROLOGY | Facility: HOSPITAL | Age: 70
End: 2025-01-16
Payer: MEDICARE

## 2025-01-17 DIAGNOSIS — Z00.00 HEALTHCARE MAINTENANCE: ICD-10-CM

## 2025-01-17 RX ORDER — VALACYCLOVIR HYDROCHLORIDE 500 MG/1
500 TABLET, FILM COATED ORAL DAILY
Qty: 30 TABLET | Refills: 1 | Status: SHIPPED | OUTPATIENT
Start: 2025-01-17 | End: 2026-01-17

## 2025-02-10 ENCOUNTER — OFFICE VISIT (OUTPATIENT)
Dept: PAIN MEDICINE | Facility: CLINIC | Age: 70
End: 2025-02-10
Payer: MEDICARE

## 2025-02-10 VITALS — HEART RATE: 62 BPM | SYSTOLIC BLOOD PRESSURE: 124 MMHG | DIASTOLIC BLOOD PRESSURE: 60 MMHG | OXYGEN SATURATION: 98 %

## 2025-02-10 DIAGNOSIS — Z79.891 LONG TERM (CURRENT) USE OF OPIATE ANALGESIC: ICD-10-CM

## 2025-02-10 DIAGNOSIS — M54.51 VERTEBROGENIC LOW BACK PAIN: Primary | ICD-10-CM

## 2025-02-10 DIAGNOSIS — M54.30 SCIATIC LEG PAIN: ICD-10-CM

## 2025-02-10 PROCEDURE — 99214 OFFICE O/P EST MOD 30 MIN: CPT | Performed by: ANESTHESIOLOGY

## 2025-02-10 PROCEDURE — 1159F MED LIST DOCD IN RCRD: CPT | Performed by: ANESTHESIOLOGY

## 2025-02-10 PROCEDURE — 1160F RVW MEDS BY RX/DR IN RCRD: CPT | Performed by: ANESTHESIOLOGY

## 2025-02-10 PROCEDURE — G2211 COMPLEX E/M VISIT ADD ON: HCPCS | Performed by: ANESTHESIOLOGY

## 2025-02-10 PROCEDURE — 1036F TOBACCO NON-USER: CPT | Performed by: ANESTHESIOLOGY

## 2025-02-10 PROCEDURE — 1123F ACP DISCUSS/DSCN MKR DOCD: CPT | Performed by: ANESTHESIOLOGY

## 2025-02-10 PROCEDURE — 1125F AMNT PAIN NOTED PAIN PRSNT: CPT | Performed by: ANESTHESIOLOGY

## 2025-02-10 RX ORDER — TRAMADOL HYDROCHLORIDE 50 MG/1
50 TABLET ORAL 3 TIMES DAILY PRN
Qty: 90 TABLET | Refills: 2 | Status: SHIPPED | OUTPATIENT
Start: 2025-02-19 | End: 2025-05-20

## 2025-02-10 RX ORDER — CEFAZOLIN SODIUM 2 G/100ML
2 INJECTION, SOLUTION INTRAVENOUS ONCE
OUTPATIENT
Start: 2025-02-10 | End: 2025-02-10

## 2025-02-10 ASSESSMENT — ENCOUNTER SYMPTOMS
ENDOCRINE NEGATIVE: 1
DEPRESSION: 0
EYES NEGATIVE: 1
BACK PAIN: 1
NEUROLOGICAL NEGATIVE: 1
PSYCHIATRIC NEGATIVE: 1
CONSTITUTIONAL NEGATIVE: 1
HEMATOLOGIC/LYMPHATIC NEGATIVE: 1
LOSS OF SENSATION IN FEET: 0
CARDIOVASCULAR NEGATIVE: 1
OCCASIONAL FEELINGS OF UNSTEADINESS: 0
GASTROINTESTINAL NEGATIVE: 1
RESPIRATORY NEGATIVE: 1

## 2025-02-10 ASSESSMENT — PAIN - FUNCTIONAL ASSESSMENT: PAIN_FUNCTIONAL_ASSESSMENT: 0-10

## 2025-02-10 ASSESSMENT — PAIN SCALES - GENERAL
PAINLEVEL_OUTOF10: 9
PAINLEVEL_OUTOF10: 9

## 2025-02-10 ASSESSMENT — PAIN DESCRIPTION - DESCRIPTORS: DESCRIPTORS: ACHING;SHOOTING;SPASM

## 2025-02-10 NOTE — LETTER
February 10, 2025     Jenna Bar    Patient: Belinda Marsh   YOB: 1955   Date of Visit: 2/10/2025       Dear Jenna Bar:    Plz schedule L4, L5, S1 Intracept       Sincerely,     Jennifer Nash MD      CC: No Recipients  ______________________________________________________________________________________    PAIN MANAGEMENT FOLLOW-UP OFFICE NOTE    Date of Service: 2/10/2025    SUBJECTIVE    CHIEF COMPLAINT: LBP    HISTORY OF PRESENT ILLNESS    Ambar Marsh is a 69 y.o. female with PMH MDD, former smoker, OA, marijuana use, ET who presents for F/U.    Pt present to review MRI L-spine images and discuss options.  Taking tramadol 50 mg BID PRN with good relief. Denies SE.    Pt denies new-onset numbness, weakness, bowel/bladder incontinence.  Pt denies recent infection, allergy to Latex/iodine/contrast. Patient is currently taking the following blood thinner(s): N/A    Procedure log:  -L4-5 ILESI 6/26/24 w/ Dr Bhatti: improved radicular pain    REVIEW OF SYSTEMS  Review of Systems   Constitutional: Negative.    HENT: Negative.     Eyes: Negative.    Respiratory: Negative.     Cardiovascular: Negative.    Gastrointestinal: Negative.    Endocrine: Negative.    Musculoskeletal:  Positive for back pain.   Skin: Negative.    Neurological: Negative.    Hematological: Negative.    Psychiatric/Behavioral: Negative.         PAST MEDICAL HISTORY  Past Medical History:   Diagnosis Date   • Abnormal Pap smear of cervix    • Acute sinusitis, unspecified 01/29/2019    Acute non-recurrent sinusitis, unspecified location   • Body mass index (BMI) 21.0-21.9, adult 02/25/2020    BMI 21.0-21.9, adult   • Body mass index (BMI) 22.0-22.9, adult 01/14/2021    BMI 22.0-22.9, adult   • Body mass index (BMI) 22.0-22.9, adult     BMI 22.0-22.9, adult   • Body mass index (BMI) 22.0-22.9, adult 10/21/2021    BMI 22.0-22.9, adult   • Body mass index (BMI) 23.0-23.9, adult     BMI 23.0-23.9, adult   • Body mass  index (BMI) 23.0-23.9, adult 08/05/2021    BMI 23.0-23.9, adult   • Body mass index (BMI) 23.0-23.9, adult 09/07/2021    BMI 23.0-23.9, adult   • Dependent relative needing care at home 06/27/2019    Caregiver stress   • Diarrhea, unspecified 10/26/2021    Acute diarrhea   • Disease of spinal cord, unspecified 08/05/2021    Lumbar myelopathy   • Encounter for screening for infections with a predominantly sexual mode of transmission 12/08/2016    Screen for STD (sexually transmitted disease)   • Epigastric pain 12/05/2018    Abdominal pain, chronic, epigastric   • Gastritis 09/13/2023   • Herpes 2022   • HPV (human papilloma virus) infection 2022   • Medial epicondylitis, right elbow 01/14/2021    Medial epicondylitis of right elbow   • Migraine without aura, intractable, with status migrainosus 10/02/2019    Intractable migraine without aura and with status migrainosus   • Other conditions influencing health status 12/05/2018    Chronic migraine   • Other forms of dyspnea 05/25/2016    Dyspnea on exertion   • Pain in right elbow     Right elbow pain   • Pain in right knee     Knee pain, right   • Pain in right wrist     Right wrist pain   • Pain in unspecified hand 03/15/2016    Pain of hand   • Pain in unspecified knee     Knee pain   • Partial obstruction of small intestine (Multi) 09/13/2023   • Personal history of other diseases of the digestive system 01/29/2019    History of small bowel obstruction   • Personal history of other diseases of the musculoskeletal system and connective tissue 05/04/2017    History of neck pain   • Personal history of other endocrine, nutritional and metabolic disease 05/04/2017    History of hyperglycemia   • Personal history of other medical treatment 05/25/2016    History of screening mammography   • Personal history of other mental and behavioral disorders 02/25/2020    History of anxiety   • Personal history of other specified conditions 08/05/2021    History of confusion   •  Personal history of other specified conditions 2016    History of chronic cough   • Personal history of other specified conditions 2020    History of chest pain at rest   • Personal history of other specified conditions 10/21/2021    History of diarrhea   • Radiculopathy, cervical region 2021    Cervical radiculopathy at C5   • Sciatica, left side 2020    Sciatica of left side   • Unspecified abdominal pain 10/21/2021    Pain, abdominal, nonspecific   • Unspecified abdominal pain 10/26/2021    Abdominal pain in female patient   • Urinary tract infection, site not specified 2021    Acute UTI     Past Surgical History:   Procedure Laterality Date   • APPENDECTOMY  2016    Appendectomy   •  SECTION, LOW TRANSVERSE  3/20/1987   • COLPOSCOPY     • LASIK     • MYOMECTOMY  1975     Family History   Problem Relation Name Age of Onset   • Lung cancer Mother  Moviel    • Breast cancer Mother  Moviel    • Arthritis Mother  Moviel    • Heart disease Father Seth GLORIA MOVIEL    • Alcohol abuse Sister Etta    • Diabetes Brother Dead    • Hypertension Brother Dead    • Hypertension Brother Kalen Moviel    • Gout Brother Kalen Moviel    • Throat cancer Brother Kalen Moviel    • Diabetes Maternal Grandmother     • Breast cancer Paternal Grandmother Marisol Moviel        CURRENT MEDICATIONS  Current Outpatient Medications   Medication Sig Dispense Refill   • methocarbamol (Robaxin) 750 mg tablet take 1 tablet by mouth once daily as needed for muscle spasms 90 tablet 0   • propranolol (Inderal) 10 mg tablet Take 1 tablet (10 mg) by mouth once daily as needed (Anxiety causing worsening tremor). 30 tablet 2   • sertraline (Zoloft) 100 mg tablet TAKE ONE TABLET BY MOUTH ONCE DAILY 90 tablet 1   • traMADol (Ultram) 50 mg tablet Take 1 tablet (50 mg) by mouth 2 times a day as needed for severe pain (7 - 10). 60 tablet 2   • valACYclovir (Valtrex) 500 mg tablet Take 1 tablet  (500 mg) by mouth once daily. 30 tablet 1   • ezetimibe (Zetia) 10 mg tablet Take 1 tablet (10 mg) by mouth once daily. 30 tablet 5   • naloxone (Narcan) 4 mg/0.1 mL nasal spray Administer 1 spray (4 mg) into affected nostril(s) if needed for opioid reversal. May repeat every 2-3 minutes if needed, alternating nostrils, until medical assistance becomes available. 2 each 0     No current facility-administered medications for this visit.       ALLERGIES AND DRUG REACTIONS  Allergies   Allergen Reactions   • Codeine Itching   • Ibuprofen Unknown   • Levofloxacin Other   • Prednisone Other   • Statins-Hmg-Coa Reductase Inhibitors Other   • Sulfamethoxazole Hives          OBJECTIVE  Visit Vitals  /60   Pulse 62   SpO2 98%   OB Status Postmenopausal   Smoking Status Former       Last Recorded Pain Score (if available):                Physical Exam  General: Sitting in chair, NAD  Head: NCAT  Eyes: Sclera/conjunctiva clear, EOMI, PERRL  Nose/mouth: MMM  CV: Good distal pulses  Lungs: Good/equal chest excursion  Abdomen: Soft, ND  Ext: No cyanosis/edema  MSK: L-spine alignment: unremarkable, BL paraspinal m TTP, L-spine ROM: pain on flexion/extension    Neuro: AAOx3, CN grossly nl     Psych: affect nl  Skin: no rash/lesions      REVIEW OF LABORATORY DATA  I have reviewed the following lab results:  WBC   Date Value Ref Range Status   04/08/2024 9.6 4.4 - 11.3 x10*3/uL Final     RBC   Date Value Ref Range Status   04/08/2024 4.79 4.00 - 5.20 x10*6/uL Final     Hemoglobin   Date Value Ref Range Status   04/08/2024 14.6 12.0 - 16.0 g/dL Final     Hematocrit   Date Value Ref Range Status   04/08/2024 43.9 36.0 - 46.0 % Final     MCV   Date Value Ref Range Status   04/08/2024 92 80 - 100 fL Final     MCH   Date Value Ref Range Status   04/08/2024 30.5 26.0 - 34.0 pg Final     MCHC   Date Value Ref Range Status   04/08/2024 33.3 32.0 - 36.0 g/dL Final     RDW   Date Value Ref Range Status   04/08/2024 12.1 11.5 - 14.5 %  "Final     Platelets   Date Value Ref Range Status   04/08/2024 309 150 - 450 x10*3/uL Final     MPV   Date Value Ref Range Status   03/30/2021 10.4 7.0 - 12.6 CU Final     Sodium   Date Value Ref Range Status   04/08/2024 140 136 - 145 mmol/L Final     Potassium   Date Value Ref Range Status   04/08/2024 4.5 3.5 - 5.3 mmol/L Final     Bicarbonate   Date Value Ref Range Status   04/08/2024 29 21 - 32 mmol/L Final     Urea Nitrogen   Date Value Ref Range Status   04/08/2024 17 6 - 23 mg/dL Final     Calcium   Date Value Ref Range Status   04/08/2024 10.1 8.6 - 10.6 mg/dL Final     No results found for: \"PROTIME\", \"PTT\", \"INR\", \"FIBRINOGEN\"      REVIEW OF RADIOLOGY   I have reviewed the following:  Radiology Studies           MRI L-spine 11/4/24 @[facility]:               ASSESSMENT & PLAN  Ambarse GUI Marsh is a 69 y.o. female with PMH MDD, former smoker, OA, marijuana use, ET who presents for F/U.    1) Vertebrogenic LBP  -Since 2015 with radiation to BL hips/groins and occasionally to lateral feet w/o obj deficit. LBP reproduced in anterior column patterns such as bending and prolonged sitting  -Refractive to yrs of conservative tx including Tylenol, NSAIDs, TENS, Epsom salt, acupuncture, >6 w chiro, massage, SSRI, >6 w PT, SELMA, marijuana, tramadol  -XR Bl hips 10/14/24: Unremarkable  -MRI L-spine 11/4/24 @[facility]: Mild lumbar degenerative changes with grade 1 anterolisthesis and mild spinal canal stenosis at L4-5. Review of images reveals Type II modic changes at L5-S1 as well as at L4-5  -Schedule L4, L5, S1 basivertebral nerve ablation to target pain generator as seen on imaging and minimize risk/likelihood of chronic opioid use and/or surgery. See detailed discussion below.   -Cont PRN Robaxin  -See opioid mgmt below    2) Opioid mgmt  -2/2 chronic LBP refractive to yrs of conservative, interventional tx  -On tramadol 50 mg BID PRN with good relief. Denies SE. Requests RF  - reviewed/appropriate. Reviewed UDS " 10/14/24: OK. No sign of aberrant behavior. Consider UDS next visit  -Educated on/prescribed Narcan 10/14/24 in context of Robaxin co-use and former marijuana use   -RF tramadol x30 d +2 RF  -F/U 3 mo        Discussed patient's history of chronic axial low back pain impacting ability to stand and/or sit for prolonged periods and impairing ability to function and complete ADLS, limiting their quality of life. Reviewed their failure to respond to extensive conservative management including medication treatment, physical therapy, and/or injections.     The patient's mental health is stable and certainly not a major contributor to this patient's chronic back pain.     Pt seeks long-tem treatment to pain generator as visualized on CT/MRI. They would prefer to avoid the risk and recovery associated with spine surgery.  Discussed [facility] and Intracept procedure to target patient's anterior column vertebrogenic/discogenic pain via percutaneous basivertebral nerve ablation under fluoroscopic guidance. Referenced minimally invasive technique, which mitigates risk factors like bleeding, infection, nerve damage.  Referenced 2020 SMART durability study revealing 5-year data of improved VAS and KILLIAN as well as other available literature.  Provided with literature and advised to follow-up should they choose to proceed    Today's visit involved continuation of chronic pain care. In the context of the complexity of this patient's chronic pain diagnosis, long-term expectations and care planning discussed. Adequate time taken to ensure patient understanding and answer questions. Imaging studies ordered are placed do elucidate the patient's diagnosis, but also to evaluate the patient's candidacy for procedural and surgical interventions. The risks and benefits of these potential interventions are detailed as above.              Jennifer Nash MD  Anesthesiologist & Interventional Pain Physician   [facility] [place]  O:  [phone]  F: [phone]  11:19 AM  02/10/25

## 2025-02-10 NOTE — PROGRESS NOTES
PAIN MANAGEMENT FOLLOW-UP OFFICE NOTE    Date of Service: 2/10/2025    SUBJECTIVE    CHIEF COMPLAINT: LBP    HISTORY OF PRESENT ILLNESS    Ambar Marsh is a 69 y.o. female with PMH MDD, former smoker, OA, marijuana use, ET who presents for F/U.    Pt present to review MRI L-spine images and discuss options.  Taking tramadol 50 mg BID PRN with good relief. Denies SE.    Pt denies new-onset numbness, weakness, bowel/bladder incontinence.  Pt denies recent infection, allergy to Latex/iodine/contrast. Patient is currently taking the following blood thinner(s): N/A    Procedure log:  -L4-5 ILESI 6/26/24 w/ Dr Bhatti: improved radicular pain    REVIEW OF SYSTEMS  Review of Systems   Constitutional: Negative.    HENT: Negative.     Eyes: Negative.    Respiratory: Negative.     Cardiovascular: Negative.    Gastrointestinal: Negative.    Endocrine: Negative.    Musculoskeletal:  Positive for back pain.   Skin: Negative.    Neurological: Negative.    Hematological: Negative.    Psychiatric/Behavioral: Negative.         PAST MEDICAL HISTORY  Past Medical History:   Diagnosis Date    Abnormal Pap smear of cervix     Acute sinusitis, unspecified 01/29/2019    Acute non-recurrent sinusitis, unspecified location    Body mass index (BMI) 21.0-21.9, adult 02/25/2020    BMI 21.0-21.9, adult    Body mass index (BMI) 22.0-22.9, adult 01/14/2021    BMI 22.0-22.9, adult    Body mass index (BMI) 22.0-22.9, adult     BMI 22.0-22.9, adult    Body mass index (BMI) 22.0-22.9, adult 10/21/2021    BMI 22.0-22.9, adult    Body mass index (BMI) 23.0-23.9, adult     BMI 23.0-23.9, adult    Body mass index (BMI) 23.0-23.9, adult 08/05/2021    BMI 23.0-23.9, adult    Body mass index (BMI) 23.0-23.9, adult 09/07/2021    BMI 23.0-23.9, adult    Dependent relative needing care at home 06/27/2019    Caregiver stress    Diarrhea, unspecified 10/26/2021    Acute diarrhea    Disease of spinal cord, unspecified 08/05/2021    Lumbar myelopathy     Encounter for screening for infections with a predominantly sexual mode of transmission 12/08/2016    Screen for STD (sexually transmitted disease)    Epigastric pain 12/05/2018    Abdominal pain, chronic, epigastric    Gastritis 09/13/2023    Herpes 2022    HPV (human papilloma virus) infection 2022    Medial epicondylitis, right elbow 01/14/2021    Medial epicondylitis of right elbow    Migraine without aura, intractable, with status migrainosus 10/02/2019    Intractable migraine without aura and with status migrainosus    Other conditions influencing health status 12/05/2018    Chronic migraine    Other forms of dyspnea 05/25/2016    Dyspnea on exertion    Pain in right elbow     Right elbow pain    Pain in right knee     Knee pain, right    Pain in right wrist     Right wrist pain    Pain in unspecified hand 03/15/2016    Pain of hand    Pain in unspecified knee     Knee pain    Partial obstruction of small intestine (Multi) 09/13/2023    Personal history of other diseases of the digestive system 01/29/2019    History of small bowel obstruction    Personal history of other diseases of the musculoskeletal system and connective tissue 05/04/2017    History of neck pain    Personal history of other endocrine, nutritional and metabolic disease 05/04/2017    History of hyperglycemia    Personal history of other medical treatment 05/25/2016    History of screening mammography    Personal history of other mental and behavioral disorders 02/25/2020    History of anxiety    Personal history of other specified conditions 08/05/2021    History of confusion    Personal history of other specified conditions 05/25/2016    History of chronic cough    Personal history of other specified conditions 08/25/2020    History of chest pain at rest    Personal history of other specified conditions 10/21/2021    History of diarrhea    Radiculopathy, cervical region 08/12/2021    Cervical radiculopathy at C5    Sciatica, left side  2020    Sciatica of left side    Unspecified abdominal pain 10/21/2021    Pain, abdominal, nonspecific    Unspecified abdominal pain 10/26/2021    Abdominal pain in female patient    Urinary tract infection, site not specified 2021    Acute UTI     Past Surgical History:   Procedure Laterality Date    APPENDECTOMY  2016    Appendectomy     SECTION, LOW TRANSVERSE  3/20/1987    COLPOSCOPY      LASIK  2004    MYOMECTOMY  1975     Family History   Problem Relation Name Age of Onset    Lung cancer Mother  Moviel     Breast cancer Mother  Moviel     Arthritis Mother  Moviel     Heart disease Father Seth GLORIA MOVIEL     Alcohol abuse Sister Etta     Diabetes Brother Dead     Hypertension Brother Dead     Hypertension Brother Kalen Moviel     Gout Brother Kalen Moviel     Throat cancer Brother Kalen Moviel     Diabetes Maternal Grandmother      Breast cancer Paternal Grandmother Marisol Moviel        CURRENT MEDICATIONS  Current Outpatient Medications   Medication Sig Dispense Refill    methocarbamol (Robaxin) 750 mg tablet take 1 tablet by mouth once daily as needed for muscle spasms 90 tablet 0    propranolol (Inderal) 10 mg tablet Take 1 tablet (10 mg) by mouth once daily as needed (Anxiety causing worsening tremor). 30 tablet 2    sertraline (Zoloft) 100 mg tablet TAKE ONE TABLET BY MOUTH ONCE DAILY 90 tablet 1    traMADol (Ultram) 50 mg tablet Take 1 tablet (50 mg) by mouth 2 times a day as needed for severe pain (7 - 10). 60 tablet 2    valACYclovir (Valtrex) 500 mg tablet Take 1 tablet (500 mg) by mouth once daily. 30 tablet 1    ezetimibe (Zetia) 10 mg tablet Take 1 tablet (10 mg) by mouth once daily. 30 tablet 5    naloxone (Narcan) 4 mg/0.1 mL nasal spray Administer 1 spray (4 mg) into affected nostril(s) if needed for opioid reversal. May repeat every 2-3 minutes if needed, alternating nostrils, until medical assistance becomes available. 2 each 0     No current  facility-administered medications for this visit.       ALLERGIES AND DRUG REACTIONS  Allergies   Allergen Reactions    Codeine Itching    Ibuprofen Unknown    Levofloxacin Other    Prednisone Other    Statins-Hmg-Coa Reductase Inhibitors Other    Sulfamethoxazole Hives          OBJECTIVE  Visit Vitals  /60   Pulse 62   SpO2 98%   OB Status Postmenopausal   Smoking Status Former       Last Recorded Pain Score (if available):                Physical Exam  General: Sitting in chair, NAD  Head: NCAT  Eyes: Sclera/conjunctiva clear, EOMI, PERRL  Nose/mouth: MMM  CV: Good distal pulses  Lungs: Good/equal chest excursion  Abdomen: Soft, ND  Ext: No cyanosis/edema  MSK: L-spine alignment: unremarkable, BL paraspinal m TTP, L-spine ROM: pain on flexion/extension    Neuro: AAOx3, CN grossly nl     Psych: affect nl  Skin: no rash/lesions      REVIEW OF LABORATORY DATA  I have reviewed the following lab results:  WBC   Date Value Ref Range Status   04/08/2024 9.6 4.4 - 11.3 x10*3/uL Final     RBC   Date Value Ref Range Status   04/08/2024 4.79 4.00 - 5.20 x10*6/uL Final     Hemoglobin   Date Value Ref Range Status   04/08/2024 14.6 12.0 - 16.0 g/dL Final     Hematocrit   Date Value Ref Range Status   04/08/2024 43.9 36.0 - 46.0 % Final     MCV   Date Value Ref Range Status   04/08/2024 92 80 - 100 fL Final     MCH   Date Value Ref Range Status   04/08/2024 30.5 26.0 - 34.0 pg Final     MCHC   Date Value Ref Range Status   04/08/2024 33.3 32.0 - 36.0 g/dL Final     RDW   Date Value Ref Range Status   04/08/2024 12.1 11.5 - 14.5 % Final     Platelets   Date Value Ref Range Status   04/08/2024 309 150 - 450 x10*3/uL Final     MPV   Date Value Ref Range Status   03/30/2021 10.4 7.0 - 12.6 CU Final     Sodium   Date Value Ref Range Status   04/08/2024 140 136 - 145 mmol/L Final     Potassium   Date Value Ref Range Status   04/08/2024 4.5 3.5 - 5.3 mmol/L Final     Bicarbonate   Date Value Ref Range Status   04/08/2024 29 21 -  "32 mmol/L Final     Urea Nitrogen   Date Value Ref Range Status   04/08/2024 17 6 - 23 mg/dL Final     Calcium   Date Value Ref Range Status   04/08/2024 10.1 8.6 - 10.6 mg/dL Final     No results found for: \"PROTIME\", \"PTT\", \"INR\", \"FIBRINOGEN\"      REVIEW OF RADIOLOGY   I have reviewed the following:  Radiology Studies           MRI L-spine 11/4/24 @[facility]:               ASSESSMENT & PLAN  Ambar Marsh is a 69 y.o. female with PMH MDD, former smoker, OA, marijuana use, ET who presents for F/U.    1) Vertebrogenic LBP  -Since 2015 with radiation to BL hips/groins and occasionally to lateral feet w/o obj deficit. LBP reproduced in anterior column patterns such as bending and prolonged sitting  -Refractive to yrs of conservative tx including Tylenol, NSAIDs, TENS, Epsom salt, acupuncture, >6 w chiro, massage, SSRI, >6 w PT, SELMA, marijuana, tramadol  -XR Bl hips 10/14/24: Unremarkable  -MRI L-spine 11/4/24 @[facility]: Mild lumbar degenerative changes with grade 1 anterolisthesis and mild spinal canal stenosis at L4-5. Review of images reveals Type II modic changes at L5-S1 as well as at L4-5  -Schedule L4, L5, S1 basivertebral nerve ablation to target pain generator as seen on imaging and minimize risk/likelihood of chronic opioid use and/or surgery. See detailed discussion below.   -Cont PRN Robaxin  -See opioid mgmt below    2) Opioid mgmt  -2/2 chronic LBP refractive to yrs of conservative, interventional tx  -On tramadol 50 mg BID PRN with good relief. Denies SE. Requests RF  - reviewed/appropriate. Reviewed UDS 10/14/24: OK. No sign of aberrant behavior. Consider UDS next visit  -Educated on/prescribed Narcan 10/14/24 in context of Robaxin co-use and former marijuana use   -RF tramadol x30 d +2 RF  -F/U 3 mo        Discussed patient's history of chronic axial low back pain impacting ability to stand and/or sit for prolonged periods and impairing ability to function and complete ADLS, limiting their quality of " life. Reviewed their failure to respond to extensive conservative management including medication treatment, physical therapy, and/or injections.     The patient's mental health is stable and certainly not a major contributor to this patient's chronic back pain.     Pt seeks long-tem treatment to pain generator as visualized on CT/MRI. They would prefer to avoid the risk and recovery associated with spine surgery.  Discussed Relievant and Intracept procedure to target patient's anterior column vertebrogenic/discogenic pain via percutaneous basivertebral nerve ablation under fluoroscopic guidance. Referenced minimally invasive technique, which mitigates risk factors like bleeding, infection, nerve damage.  Referenced 2020 SMART durability study revealing 5-year data of improved VAS and KILLIAN as well as other available literature.  Provided with literature and advised to follow-up should they choose to proceed    Today's visit involved continuation of chronic pain care. In the context of the complexity of this patient's chronic pain diagnosis, long-term expectations and care planning discussed. Adequate time taken to ensure patient understanding and answer questions. Imaging studies ordered are placed do elucidate the patient's diagnosis, but also to evaluate the patient's candidacy for procedural and surgical interventions. The risks and benefits of these potential interventions are detailed as above.              Jennifer Nash MD  Anesthesiologist & Interventional Pain Physician   [facility]  O: [phone]  F: [phone]  11:19 AM  02/10/25

## 2025-02-10 NOTE — PROGRESS NOTES
MEDICATION NAME: tramadol  STRENGTH: 50 mg  LAST FILL DATE: 2024  DATE LAST TAKEN: 02/10/2025  QUANTITY FILLED: 60  QUANTITY REMAININ  COUNT COMPLETED BY: FAIZAN COLLINS RN and MIKAEL CLIFTON LPN      UDS LAST COMPLETED:   CONTROLLED SUBSTANCES AGREEMENT LAST SIGNED:   ORT LAST COMPLETED:  Modified Oswestry disability form filled out annually.

## 2025-03-19 ENCOUNTER — TELEPHONE (OUTPATIENT)
Dept: PAIN MEDICINE | Facility: CLINIC | Age: 70
End: 2025-03-19
Payer: MEDICARE

## 2025-03-19 NOTE — TELEPHONE ENCOUNTER
"Ambar called with questions regarding her a\"nerve burning\" you talked to her about at her last office visit.  She also has thoracic pain that she has medication for and wants to know if the \"nerve burning  surgery\" will help that also?  She also stated that she got approval  from her iinsurance company that her procedure was approved.  She is very nervous about the nerve burning that she will be a cripple. Ambar has an appointment with you in May and wants to discuss this with you then.   "

## 2025-03-21 NOTE — TELEPHONE ENCOUNTER
Called patient. No answer. Left voicemail for call back.      Jennifer Nash MD  Anesthesiologist & Interventional Pain Physician   [facility]  O: [phone]  F: [phone]  12:46 PM  03/21/25

## 2025-04-07 ENCOUNTER — PREP FOR PROCEDURE (OUTPATIENT)
Dept: PAIN MEDICINE | Facility: CLINIC | Age: 70
End: 2025-04-07
Payer: MEDICARE

## 2025-04-07 DIAGNOSIS — M54.51 VERTEBROGENIC LOW BACK PAIN: Primary | ICD-10-CM

## 2025-04-07 RX ORDER — CEFAZOLIN SODIUM 2 G/100ML
2 INJECTION, SOLUTION INTRAVENOUS ONCE
OUTPATIENT
Start: 2025-04-07 | End: 2025-04-07

## 2025-04-10 DIAGNOSIS — M54.30 SCIATIC LEG PAIN: ICD-10-CM

## 2025-04-11 RX ORDER — METHOCARBAMOL 750 MG/1
750 TABLET, FILM COATED ORAL DAILY PRN
Qty: 90 TABLET | Refills: 0 | Status: SHIPPED | OUTPATIENT
Start: 2025-04-11

## 2025-04-15 ENCOUNTER — OFFICE VISIT (OUTPATIENT)
Dept: PRIMARY CARE | Facility: CLINIC | Age: 70
End: 2025-04-15
Payer: MEDICARE

## 2025-04-15 ENCOUNTER — APPOINTMENT (OUTPATIENT)
Dept: PRIMARY CARE | Facility: CLINIC | Age: 70
End: 2025-04-15
Payer: MEDICARE

## 2025-04-15 VITALS
HEIGHT: 64 IN | HEART RATE: 63 BPM | WEIGHT: 135 LBS | BODY MASS INDEX: 23.05 KG/M2 | SYSTOLIC BLOOD PRESSURE: 116 MMHG | OXYGEN SATURATION: 96 % | DIASTOLIC BLOOD PRESSURE: 68 MMHG

## 2025-04-15 DIAGNOSIS — I10 BENIGN ESSENTIAL HTN: ICD-10-CM

## 2025-04-15 DIAGNOSIS — Z13.29 SCREENING FOR THYROID DISORDER: ICD-10-CM

## 2025-04-15 DIAGNOSIS — Z13.1 SCREENING FOR DIABETES MELLITUS: ICD-10-CM

## 2025-04-15 DIAGNOSIS — Z87.891 FORMER SMOKER: ICD-10-CM

## 2025-04-15 DIAGNOSIS — E55.9 VITAMIN D DEFICIENCY: ICD-10-CM

## 2025-04-15 DIAGNOSIS — E78.2 MIXED HYPERLIPIDEMIA: ICD-10-CM

## 2025-04-15 DIAGNOSIS — Z12.31 SCREENING MAMMOGRAM FOR BREAST CANCER: ICD-10-CM

## 2025-04-15 DIAGNOSIS — Z11.59 SCREENING FOR VIRAL DISEASE: ICD-10-CM

## 2025-04-15 DIAGNOSIS — Z00.00 HEALTHCARE MAINTENANCE: ICD-10-CM

## 2025-04-15 DIAGNOSIS — Z13.6 ENCOUNTER FOR SCREENING FOR CARDIOVASCULAR DISORDERS: ICD-10-CM

## 2025-04-15 DIAGNOSIS — Z00.00 MEDICARE ANNUAL WELLNESS VISIT, SUBSEQUENT: Primary | ICD-10-CM

## 2025-04-15 DIAGNOSIS — R79.9 ABNORMAL FINDING OF BLOOD CHEMISTRY, UNSPECIFIED: ICD-10-CM

## 2025-04-15 DIAGNOSIS — Z00.00 WELLNESS EXAMINATION: ICD-10-CM

## 2025-04-15 DIAGNOSIS — M54.30 SCIATIC LEG PAIN: ICD-10-CM

## 2025-04-15 DIAGNOSIS — F32.A DEPRESSIVE DISORDER: ICD-10-CM

## 2025-04-15 DIAGNOSIS — G25.0 ESSENTIAL TREMOR: ICD-10-CM

## 2025-04-15 PROCEDURE — G0444 DEPRESSION SCREEN ANNUAL: HCPCS | Performed by: FAMILY MEDICINE

## 2025-04-15 PROCEDURE — 1124F ACP DISCUSS-NO DSCNMKR DOCD: CPT | Performed by: FAMILY MEDICINE

## 2025-04-15 PROCEDURE — 1170F FXNL STATUS ASSESSED: CPT | Performed by: FAMILY MEDICINE

## 2025-04-15 PROCEDURE — 1126F AMNT PAIN NOTED NONE PRSNT: CPT | Performed by: FAMILY MEDICINE

## 2025-04-15 PROCEDURE — G0439 PPPS, SUBSEQ VISIT: HCPCS | Performed by: FAMILY MEDICINE

## 2025-04-15 PROCEDURE — 99397 PER PM REEVAL EST PAT 65+ YR: CPT | Performed by: FAMILY MEDICINE

## 2025-04-15 PROCEDURE — 99214 OFFICE O/P EST MOD 30 MIN: CPT | Mod: 25 | Performed by: FAMILY MEDICINE

## 2025-04-15 PROCEDURE — G0446 INTENS BEHAVE THER CARDIO DX: HCPCS | Performed by: FAMILY MEDICINE

## 2025-04-15 PROCEDURE — 3008F BODY MASS INDEX DOCD: CPT | Performed by: FAMILY MEDICINE

## 2025-04-15 PROCEDURE — 99215 OFFICE O/P EST HI 40 MIN: CPT | Performed by: FAMILY MEDICINE

## 2025-04-15 PROCEDURE — 3078F DIAST BP <80 MM HG: CPT | Performed by: FAMILY MEDICINE

## 2025-04-15 PROCEDURE — 99214 OFFICE O/P EST MOD 30 MIN: CPT | Performed by: FAMILY MEDICINE

## 2025-04-15 PROCEDURE — G2211 COMPLEX E/M VISIT ADD ON: HCPCS | Performed by: FAMILY MEDICINE

## 2025-04-15 PROCEDURE — 3074F SYST BP LT 130 MM HG: CPT | Performed by: FAMILY MEDICINE

## 2025-04-15 RX ORDER — SERTRALINE HYDROCHLORIDE 50 MG/1
TABLET, FILM COATED ORAL
Qty: 21 TABLET | Refills: 0 | Status: SHIPPED | OUTPATIENT
Start: 2025-04-15 | End: 2025-05-06

## 2025-04-15 RX ORDER — PROPRANOLOL HYDROCHLORIDE 10 MG/1
10 TABLET ORAL DAILY PRN
Qty: 30 TABLET | Refills: 2 | Status: SHIPPED | OUTPATIENT
Start: 2025-04-15 | End: 2025-07-14

## 2025-04-15 RX ORDER — METHOCARBAMOL 750 MG/1
750 TABLET, FILM COATED ORAL DAILY PRN
Qty: 90 TABLET | Refills: 0 | Status: SHIPPED | OUTPATIENT
Start: 2025-04-15 | End: 2025-07-14

## 2025-04-15 ASSESSMENT — PATIENT HEALTH QUESTIONNAIRE - PHQ9
2. FEELING DOWN, DEPRESSED OR HOPELESS: NOT AT ALL
SUM OF ALL RESPONSES TO PHQ9 QUESTIONS 1 AND 2: 0
1. LITTLE INTEREST OR PLEASURE IN DOING THINGS: NOT AT ALL

## 2025-04-15 ASSESSMENT — ACTIVITIES OF DAILY LIVING (ADL)
MANAGING_FINANCES: INDEPENDENT
TAKING_MEDICATION: INDEPENDENT
GROCERY_SHOPPING: INDEPENDENT
BATHING: INDEPENDENT
DOING_HOUSEWORK: INDEPENDENT
DRESSING: INDEPENDENT

## 2025-04-15 ASSESSMENT — PAIN SCALES - GENERAL: PAINLEVEL_OUTOF10: 0-NO PAIN

## 2025-04-15 ASSESSMENT — ENCOUNTER SYMPTOMS
OCCASIONAL FEELINGS OF UNSTEADINESS: 0
DEPRESSION: 0
LOSS OF SENSATION IN FEET: 0

## 2025-04-15 NOTE — PROGRESS NOTES
69-year presents to establish care for yearly physical Medicare and wellness visit follow-up chronic medical conditions    Health Maintenance:  Eats a varied and healthy diet.  Exercises regularly.  Does not drink, smoke, use illicit substances.  Due for mammogram and Otherwise up-to-date on all routine health maintenance screenings.  Due for immunizations.  Due for yearly lab work.    1. Medicare annual wellness visit, subsequent    2. Wellness examination    3. Benign essential HTN    4. Vitamin D deficiency    5. Abnormal finding of blood chemistry, unspecified    6. Screening for thyroid disorder    7. Screening for diabetes mellitus    8. Screening for viral disease    9. Encounter for screening for cardiovascular disorders    10. Screening mammogram for breast cancer    11. Former smoker    12. Essential tremor    13. Mixed hyperlipidemia    14. Healthcare maintenance    15. Sciatic leg pain    16. Depressive disorder          Hyperlipidemia:  CT calcium score done last year of 0, statin intolerant was placed on Zetia no chest pain or shortness of breath    Depression:  Has been on Zoloft for a long time wants to try tapering off      All pertinent positive symptoms are included in history of present illness.    All other systems have been reviewed and are negative and noncontributory to this patient's current ailments.    CONSTITUTIONAL - INAD. Not ill appearing.  SKIN - No lesions or rashes visualized. Good skin turgor.  HEENT- Head is atraumatic and normocephalic. Nasal turbinates are nonerythematous and without drainage. .  RESP - CTAB. No wheezing, rhonchi, or crackles.   CARDIAC - RRR. No murmurs, gallops, or rubs.  ABDOMEN - Soft, nontender, nondistended. No organomegaly.  NEURO - CNs 2-12 grossly intact.  PSYCH - Normal mood and affect      1. Medicare annual wellness visit, subsequent (Primary)  Cardiovascular disease discussion was had including discussions of chronic medical conditions such as  hypertension, hyperlipidemia, CAD, or others. 15 minutes was spent in discussion.  and Depression screening was completed. 5 minutes was spent in this discussion.    - CBC and Auto Differential; Future  - Comprehensive Metabolic Panel; Future  - Lipid Panel; Future  - Hemoglobin A1C; Future  - TSH with reflex to Free T4 if abnormal; Future  - Vitamin D 25-Hydroxy,Total (for eval of Vitamin D levels); Future  - CBC and Auto Differential  - Comprehensive Metabolic Panel  - Lipid Panel  - Hemoglobin A1C  - TSH with reflex to Free T4 if abnormal  - Vitamin D 25-Hydroxy,Total (for eval of Vitamin D levels)    2. Wellness examination  Health and wellness topics discussed today.  Recommended eating a varied and healthy diet and made dietary recommendations, also discussed exercise and exercising regularly 30 minutes a day 3 days a week.  Immunizations recommended and updated.  Health screening guidelines discussed with patient including possible things such as colonoscopies, mammograms, prostate screenings, lung cancer screenings, bone densitometry, and other wellness topics.  Yearly lab work ordered or reviewed today.  - CBC and Auto Differential; Future  - Comprehensive Metabolic Panel; Future  - Lipid Panel; Future  - Hemoglobin A1C; Future  - TSH with reflex to Free T4 if abnormal; Future  - Vitamin D 25-Hydroxy,Total (for eval of Vitamin D levels); Future  - CBC and Auto Differential  - Comprehensive Metabolic Panel  - Lipid Panel  - Hemoglobin A1C  - TSH with reflex to Free T4 if abnormal  - Vitamin D 25-Hydroxy,Total (for eval of Vitamin D levels)    3. Benign essential HTN  Well-controlled off medication  - CBC and Auto Differential; Future  - Comprehensive Metabolic Panel; Future  - Lipid Panel; Future  - Hemoglobin A1C; Future  - TSH with reflex to Free T4 if abnormal; Future  - Vitamin D 25-Hydroxy,Total (for eval of Vitamin D levels); Future  - CBC and Auto Differential  - Comprehensive Metabolic Panel  - Lipid  Panel  - Hemoglobin A1C  - TSH with reflex to Free T4 if abnormal  - Vitamin D 25-Hydroxy,Total (for eval of Vitamin D levels)    4. Vitamin D deficiency    - CBC and Auto Differential; Future  - Comprehensive Metabolic Panel; Future  - Lipid Panel; Future  - Hemoglobin A1C; Future  - TSH with reflex to Free T4 if abnormal; Future  - Vitamin D 25-Hydroxy,Total (for eval of Vitamin D levels); Future  - CBC and Auto Differential  - Comprehensive Metabolic Panel  - Lipid Panel  - Hemoglobin A1C  - TSH with reflex to Free T4 if abnormal  - Vitamin D 25-Hydroxy,Total (for eval of Vitamin D levels)    5. Abnormal finding of blood chemistry, unspecified    - CBC and Auto Differential; Future  - Comprehensive Metabolic Panel; Future  - Lipid Panel; Future  - Hemoglobin A1C; Future  - TSH with reflex to Free T4 if abnormal; Future  - Vitamin D 25-Hydroxy,Total (for eval of Vitamin D levels); Future  - CBC and Auto Differential  - Comprehensive Metabolic Panel  - Lipid Panel  - Hemoglobin A1C  - TSH with reflex to Free T4 if abnormal  - Vitamin D 25-Hydroxy,Total (for eval of Vitamin D levels)    6. Screening for thyroid disorder    - CBC and Auto Differential; Future  - Comprehensive Metabolic Panel; Future  - Lipid Panel; Future  - Hemoglobin A1C; Future  - TSH with reflex to Free T4 if abnormal; Future  - Vitamin D 25-Hydroxy,Total (for eval of Vitamin D levels); Future  - CBC and Auto Differential  - Comprehensive Metabolic Panel  - Lipid Panel  - Hemoglobin A1C  - TSH with reflex to Free T4 if abnormal  - Vitamin D 25-Hydroxy,Total (for eval of Vitamin D levels)    7. Screening for diabetes mellitus    - CBC and Auto Differential; Future  - Comprehensive Metabolic Panel; Future  - Lipid Panel; Future  - Hemoglobin A1C; Future  - TSH with reflex to Free T4 if abnormal; Future  - Vitamin D 25-Hydroxy,Total (for eval of Vitamin D levels); Future  - CBC and Auto Differential  - Comprehensive Metabolic Panel  - Lipid Panel  -  Hemoglobin A1C  - TSH with reflex to Free T4 if abnormal  - Vitamin D 25-Hydroxy,Total (for eval of Vitamin D levels)    8. Screening for viral disease    - CBC and Auto Differential; Future  - Comprehensive Metabolic Panel; Future  - Lipid Panel; Future  - Hemoglobin A1C; Future  - TSH with reflex to Free T4 if abnormal; Future  - Vitamin D 25-Hydroxy,Total (for eval of Vitamin D levels); Future  - Hepatitis C antibody; Future  - CBC and Auto Differential  - Comprehensive Metabolic Panel  - Lipid Panel  - Hemoglobin A1C  - TSH with reflex to Free T4 if abnormal  - Vitamin D 25-Hydroxy,Total (for eval of Vitamin D levels)  - Hepatitis C antibody    9. Encounter for screening for cardiovascular disorders    - CBC and Auto Differential; Future  - Comprehensive Metabolic Panel; Future  - Lipid Panel; Future  - Hemoglobin A1C; Future  - TSH with reflex to Free T4 if abnormal; Future  - Vitamin D 25-Hydroxy,Total (for eval of Vitamin D levels); Future  - CBC and Auto Differential  - Comprehensive Metabolic Panel  - Lipid Panel  - Hemoglobin A1C  - TSH with reflex to Free T4 if abnormal  - Vitamin D 25-Hydroxy,Total (for eval of Vitamin D levels)    10. Screening mammogram for breast cancer    - BI mammo bilateral screening tomosynthesis; Future  - CBC and Auto Differential; Future  - Comprehensive Metabolic Panel; Future  - Lipid Panel; Future  - Hemoglobin A1C; Future  - TSH with reflex to Free T4 if abnormal; Future  - Vitamin D 25-Hydroxy,Total (for eval of Vitamin D levels); Future  - CBC and Auto Differential  - Comprehensive Metabolic Panel  - Lipid Panel  - Hemoglobin A1C  - TSH with reflex to Free T4 if abnormal  - Vitamin D 25-Hydroxy,Total (for eval of Vitamin D levels)    11. Former smoker  Get CT lung screening  - CT lung screening low dose; Future    12. Essential tremor  Well-controlled continue propranolol  - propranolol (Inderal) 10 mg tablet; Take 1 tablet (10 mg) by mouth once daily as needed (Anxiety  causing worsening tremor).  Dispense: 30 tablet; Refill: 2    13. Mixed hyperlipidemia  Discussed with patient about the natural history and course of hyperlipidemia.  Discussed lifestyle as well as genetic implications of hyperlipidemia.      Discussed possible treatment options of hyperlipidemia including intensive lifestyle interventions including lower carbohydrate, lower saturated fat diet as well as increasing aerobic and resistance training exercise to at least 30 minutes daily 3 times a week, hopefully more.    Discussed medication options including the use of statin medications as well as the side effects of these medications.  Discussed goal LDL of <100 for primary prevention and <70 for secondary prevention.    Discussed the cost benefit analysis of lowering cholesterol and how it will help prevent heart attacks and strokes in the future, also discussed the benefit of statin medications and the large amount of studies showing a reduction in morbidity mortality with the use of a statin medication in the setting of multiple risk factors for heart attacks and strokes.    Discussed secondary risk stratification with CT Cardiac Scoring and utilizing the PREVENT calculator to determine if statin use with actually benefit patients.     If medication was prescribed or continued, the appropriate lab work was ordered.    CT calcium score is 0, no indication for further management including statin medication at this time.  Stop Zetia.    14. Healthcare maintenance      15. Sciatic leg pain    - methocarbamol (Robaxin) 750 mg tablet; Take 1 tablet (750 mg) by mouth once daily as needed for muscle spasms.  Dispense: 90 tablet; Refill: 0    16. Depressive disorder  Tapering dose of Zoloft  - sertraline (Zoloft) 50 mg tablet; Take 1.5 tablets (75 mg) by mouth once daily for 7 days, THEN 1 tablet (50 mg) once daily for 7 days, THEN 0.5 tablets (25 mg) once daily for 7 days.  Dispense: 21 tablet; Refill: 0

## 2025-04-16 ENCOUNTER — TELEPHONE (OUTPATIENT)
Dept: PAIN MEDICINE | Facility: CLINIC | Age: 70
End: 2025-04-16
Payer: MEDICARE

## 2025-04-16 NOTE — TELEPHONE ENCOUNTER
Pt called requesting clarification for her procedure scheduled on May 22. Pt stated that she prefers to not go to [facility] for her procedure and has paperwork from her insurance stating she can go to [facility]. RN talked with pt and explained that Dr. Nash does not do procedures at [facility]. Pt had question about the paperwork received from her insurance company. RN transferred pt to another office team member who was able to explain the authorization codes and again let the pt know that procedure is scheduled for [facility]. Pt stated understanding.

## 2025-04-19 LAB
25(OH)D3+25(OH)D2 SERPL-MCNC: 95 NG/ML (ref 30–100)
ALBUMIN SERPL-MCNC: 4.6 G/DL (ref 3.6–5.1)
ALP SERPL-CCNC: 59 U/L (ref 37–153)
ALT SERPL-CCNC: 13 U/L (ref 6–29)
ANION GAP SERPL CALCULATED.4IONS-SCNC: 8 MMOL/L (CALC) (ref 7–17)
AST SERPL-CCNC: 17 U/L (ref 10–35)
BASOPHILS # BLD AUTO: 122 CELLS/UL (ref 0–200)
BASOPHILS NFR BLD AUTO: 1.8 %
BILIRUB SERPL-MCNC: 0.3 MG/DL (ref 0.2–1.2)
BUN SERPL-MCNC: 22 MG/DL (ref 7–25)
CALCIUM SERPL-MCNC: 9.4 MG/DL (ref 8.6–10.4)
CHLORIDE SERPL-SCNC: 104 MMOL/L (ref 98–110)
CHOLEST SERPL-MCNC: 206 MG/DL
CHOLEST/HDLC SERPL: 2.5 (CALC)
CO2 SERPL-SCNC: 27 MMOL/L (ref 20–32)
CREAT SERPL-MCNC: 0.65 MG/DL (ref 0.5–1.05)
EGFRCR SERPLBLD CKD-EPI 2021: 96 ML/MIN/1.73M2
EOSINOPHIL # BLD AUTO: 299 CELLS/UL (ref 15–500)
EOSINOPHIL NFR BLD AUTO: 4.4 %
ERYTHROCYTE [DISTWIDTH] IN BLOOD BY AUTOMATED COUNT: 12.7 % (ref 11–15)
EST. AVERAGE GLUCOSE BLD GHB EST-MCNC: 120 MG/DL
EST. AVERAGE GLUCOSE BLD GHB EST-SCNC: 6.6 MMOL/L
GLUCOSE SERPL-MCNC: 100 MG/DL (ref 65–99)
HBA1C MFR BLD: 5.8 %
HCT VFR BLD AUTO: 40.4 % (ref 35–45)
HCV AB SERPL QL IA: NORMAL
HDLC SERPL-MCNC: 81 MG/DL
HGB BLD-MCNC: 13.7 G/DL (ref 11.7–15.5)
LDLC SERPL CALC-MCNC: 111 MG/DL (CALC)
LYMPHOCYTES # BLD AUTO: 2264 CELLS/UL (ref 850–3900)
LYMPHOCYTES NFR BLD AUTO: 33.3 %
MCH RBC QN AUTO: 30 PG (ref 27–33)
MCHC RBC AUTO-ENTMCNC: 33.9 G/DL (ref 32–36)
MCV RBC AUTO: 88.6 FL (ref 80–100)
MONOCYTES # BLD AUTO: 612 CELLS/UL (ref 200–950)
MONOCYTES NFR BLD AUTO: 9 %
NEUTROPHILS # BLD AUTO: 3502 CELLS/UL (ref 1500–7800)
NEUTROPHILS NFR BLD AUTO: 51.5 %
NONHDLC SERPL-MCNC: 125 MG/DL (CALC)
PLATELET # BLD AUTO: 273 THOUSAND/UL (ref 140–400)
PMV BLD REES-ECKER: 10 FL (ref 7.5–12.5)
POTASSIUM SERPL-SCNC: 4.2 MMOL/L (ref 3.5–5.3)
PROT SERPL-MCNC: 7.1 G/DL (ref 6.1–8.1)
RBC # BLD AUTO: 4.56 MILLION/UL (ref 3.8–5.1)
SODIUM SERPL-SCNC: 139 MMOL/L (ref 135–146)
TRIGL SERPL-MCNC: 59 MG/DL
TSH SERPL-ACNC: 1.61 MIU/L (ref 0.4–4.5)
WBC # BLD AUTO: 6.8 THOUSAND/UL (ref 3.8–10.8)

## 2025-04-24 ENCOUNTER — HOSPITAL ENCOUNTER (OUTPATIENT)
Dept: RADIOLOGY | Facility: CLINIC | Age: 70
Discharge: HOME | End: 2025-04-24
Payer: MEDICARE

## 2025-04-24 ENCOUNTER — PATIENT MESSAGE (OUTPATIENT)
Dept: PAIN MEDICINE | Facility: CLINIC | Age: 70
End: 2025-04-24
Payer: MEDICARE

## 2025-04-24 DIAGNOSIS — Z87.891 FORMER SMOKER: ICD-10-CM

## 2025-04-24 PROCEDURE — 71271 CT THORAX LUNG CANCER SCR C-: CPT

## 2025-04-28 ENCOUNTER — OFFICE VISIT (OUTPATIENT)
Dept: PAIN MEDICINE | Facility: CLINIC | Age: 70
End: 2025-04-28
Payer: MEDICARE

## 2025-04-28 ENCOUNTER — APPOINTMENT (OUTPATIENT)
Dept: OPHTHALMOLOGY | Facility: CLINIC | Age: 70
End: 2025-04-28
Payer: MEDICARE

## 2025-04-28 VITALS
DIASTOLIC BLOOD PRESSURE: 78 MMHG | HEART RATE: 58 BPM | HEIGHT: 64 IN | OXYGEN SATURATION: 98 % | WEIGHT: 135 LBS | SYSTOLIC BLOOD PRESSURE: 121 MMHG | BODY MASS INDEX: 23.05 KG/M2

## 2025-04-28 DIAGNOSIS — M79.18 MYOFASCIAL PAIN SYNDROME: Primary | ICD-10-CM

## 2025-04-28 DIAGNOSIS — H04.123 DRY EYE SYNDROME OF LACRIMAL GLAND, BILATERAL: Primary | ICD-10-CM

## 2025-04-28 DIAGNOSIS — M54.16 CHRONIC LUMBAR RADICULOPATHY: ICD-10-CM

## 2025-04-28 DIAGNOSIS — G89.4 CHRONIC PAIN SYNDROME: ICD-10-CM

## 2025-04-28 DIAGNOSIS — M54.30 SCIATIC LEG PAIN: ICD-10-CM

## 2025-04-28 DIAGNOSIS — M47.812 OSTEOARTHRITIS OF CERVICAL SPINE, UNSPECIFIED SPINAL OSTEOARTHRITIS COMPLICATION STATUS: ICD-10-CM

## 2025-04-28 DIAGNOSIS — M54.51 VERTEBROGENIC LOW BACK PAIN: ICD-10-CM

## 2025-04-28 DIAGNOSIS — H43.811 PVD (POSTERIOR VITREOUS DETACHMENT), RIGHT EYE: ICD-10-CM

## 2025-04-28 PROBLEM — H93.13 TINNITUS OF BOTH EARS: Status: RESOLVED | Noted: 2023-09-13 | Resolved: 2025-04-28

## 2025-04-28 PROBLEM — F32.A DEPRESSIVE DISORDER: Status: RESOLVED | Noted: 2024-07-02 | Resolved: 2025-04-28

## 2025-04-28 PROBLEM — N95.1 SWEATS, MENOPAUSAL: Status: RESOLVED | Noted: 2023-09-13 | Resolved: 2025-04-28

## 2025-04-28 PROBLEM — N95.2 ATROPHIC VAGINITIS: Status: RESOLVED | Noted: 2023-09-13 | Resolved: 2025-04-28

## 2025-04-28 PROBLEM — M77.01 MEDIAL EPICONDYLITIS OF RIGHT ELBOW: Status: RESOLVED | Noted: 2023-09-13 | Resolved: 2025-04-28

## 2025-04-28 PROBLEM — G47.00 INSOMNIA: Status: RESOLVED | Noted: 2023-09-13 | Resolved: 2025-04-28

## 2025-04-28 PROBLEM — F41.9 ANXIETY: Status: RESOLVED | Noted: 2023-09-13 | Resolved: 2025-04-28

## 2025-04-28 PROCEDURE — 1125F AMNT PAIN NOTED PAIN PRSNT: CPT | Performed by: PHYSICIAN ASSISTANT

## 2025-04-28 PROCEDURE — 3008F BODY MASS INDEX DOCD: CPT | Performed by: PHYSICIAN ASSISTANT

## 2025-04-28 PROCEDURE — 1159F MED LIST DOCD IN RCRD: CPT | Performed by: PHYSICIAN ASSISTANT

## 2025-04-28 PROCEDURE — G2211 COMPLEX E/M VISIT ADD ON: HCPCS | Performed by: PHYSICIAN ASSISTANT

## 2025-04-28 PROCEDURE — 99203 OFFICE O/P NEW LOW 30 MIN: CPT | Performed by: PHYSICIAN ASSISTANT

## 2025-04-28 PROCEDURE — 99213 OFFICE O/P EST LOW 20 MIN: CPT | Performed by: STUDENT IN AN ORGANIZED HEALTH CARE EDUCATION/TRAINING PROGRAM

## 2025-04-28 PROCEDURE — 99213 OFFICE O/P EST LOW 20 MIN: CPT | Performed by: PHYSICIAN ASSISTANT

## 2025-04-28 RX ORDER — TRETINOIN 0.5 MG/G
CREAM TOPICAL
COMMUNITY
Start: 2025-02-19

## 2025-04-28 RX ORDER — NAPROXEN 500 MG/1
500 TABLET ORAL 2 TIMES DAILY
Qty: 60 TABLET | Refills: 0 | Status: SHIPPED | OUTPATIENT
Start: 2025-04-28 | End: 2025-05-28

## 2025-04-28 RX ORDER — LIFITEGRAST 50 MG/ML
1 SOLUTION/ DROPS OPHTHALMIC 2 TIMES DAILY
Qty: 60 EACH | Refills: 6 | Status: SHIPPED | OUTPATIENT
Start: 2025-04-28 | End: 2025-05-28

## 2025-04-28 ASSESSMENT — TONOMETRY
OD_IOP_MMHG: 18
OS_IOP_MMHG: 16
IOP_METHOD: TONOPEN

## 2025-04-28 ASSESSMENT — VISUAL ACUITY
OS_SC: 20/20
METHOD: SNELLEN - LINEAR
OD_SC: 20/20

## 2025-04-28 ASSESSMENT — PATIENT HEALTH QUESTIONNAIRE - PHQ9
1. LITTLE INTEREST OR PLEASURE IN DOING THINGS: NOT AT ALL
SUM OF ALL RESPONSES TO PHQ9 QUESTIONS 1 AND 2: 0
2. FEELING DOWN, DEPRESSED OR HOPELESS: NOT AT ALL

## 2025-04-28 ASSESSMENT — PAIN - FUNCTIONAL ASSESSMENT: PAIN_FUNCTIONAL_ASSESSMENT: 0-10

## 2025-04-28 ASSESSMENT — CONF VISUAL FIELD
OD_SUPERIOR_NASAL_RESTRICTION: 0
OS_INFERIOR_TEMPORAL_RESTRICTION: 0
OD_INFERIOR_TEMPORAL_RESTRICTION: 0
OS_INFERIOR_NASAL_RESTRICTION: 0
OD_NORMAL: 1
OS_SUPERIOR_TEMPORAL_RESTRICTION: 0
OS_NORMAL: 1
OD_SUPERIOR_TEMPORAL_RESTRICTION: 0
OD_INFERIOR_NASAL_RESTRICTION: 0
OS_SUPERIOR_NASAL_RESTRICTION: 0

## 2025-04-28 ASSESSMENT — REFRACTION_WEARINGRX
OD_ADD: +2.50
OS_ADD: +2.50
OD_AXIS: 031
OS_AXIS: 143
OD_SPHERE: -0.25
OD_CYLINDER: -0.50
OS_CYLINDER: -0.25
OS_SPHERE: -1.00

## 2025-04-28 ASSESSMENT — PAIN DESCRIPTION - DESCRIPTORS: DESCRIPTORS: ACHING

## 2025-04-28 ASSESSMENT — CUP TO DISC RATIO
OS_RATIO: .40
OD_RATIO: .35

## 2025-04-28 ASSESSMENT — REFRACTION_MANIFEST
OD_ADD: +2.50
OS_CYLINDER: SPHERE
OS_SPHERE: -1.00
OS_ADD: +2.50
OD_SPHERE: -0.25
OD_AXIS: 031
OD_CYLINDER: -0.50

## 2025-04-28 ASSESSMENT — ENCOUNTER SYMPTOMS
CARDIOVASCULAR NEGATIVE: 0
ENDOCRINE NEGATIVE: 0
ALLERGIC/IMMUNOLOGIC NEGATIVE: 0
PSYCHIATRIC NEGATIVE: 0
CONSTITUTIONAL NEGATIVE: 0
GASTROINTESTINAL NEGATIVE: 0
RESPIRATORY NEGATIVE: 0
HEMATOLOGIC/LYMPHATIC NEGATIVE: 0
EYES NEGATIVE: 1
NEUROLOGICAL NEGATIVE: 0
MUSCULOSKELETAL NEGATIVE: 0

## 2025-04-28 ASSESSMENT — PAIN SCALES - GENERAL
PAINLEVEL_OUTOF10: 4
PAINLEVEL_OUTOF10: 4

## 2025-04-28 ASSESSMENT — SLIT LAMP EXAM - LIDS
COMMENTS: NORMAL, MILD MGD
COMMENTS: NORMAL, MILD MGD

## 2025-04-28 ASSESSMENT — EXTERNAL EXAM - RIGHT EYE: OD_EXAM: NORMAL

## 2025-04-28 ASSESSMENT — EXTERNAL EXAM - LEFT EYE: OS_EXAM: NORMAL

## 2025-04-28 NOTE — PROGRESS NOTES
Assessment/Plan   Diagnoses and all orders for this visit:  Dry eye syndrome of lacrimal gland, bilateral  Patient currently using OTC Lipid and Aqeuous based Artificial tears and Failing with continued signs and symptoms. Failure with OTC AT's as a monotherapy  Testing to evaluate the presence of dry eye disease (DED) was performed today and provided the following results:  Tear break up time (TBUT), a measure of tear stability (0-5 = severe, 6-10 = moderate, 11-15 = mild)  OD:  4 seconds  OS: 4  seconds  Corneal punctate epithelial erosions (PEE) staining with sodium fluorescein score (5 zones, each PEE level 0-3, maximum score = 15)  OD: 1  OS: 1  Meibomian gland disease (Efron scale 0-4, 0: no abnormality, 4: thick creamy yellow expression at all gland orifices, expression continuous, conjunctival redness):  OD: 1  OS: 1    TXT plan: Xiidra BID, Refresh or Systane, Blink vitamins, warm compresses    Xiidra RX'd as medically necessary for failure with AT's and reduced TBUT as above    PVD (posterior vitreous detachment), right eye  Patient reports stable floaters right eye but they have been bothersome  Healthy stable retinal exam on DFE today  Discussed signs and symtpoms of retinal hole, tear, detachment. Patient educated that retinal detachment can lead to permanent vision loss. Patient consents to return if they notice new floaters, flashes, curtain or veil covering vision.    RTC 1 year for annual with KIKE and OKSANA
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
## 2025-04-28 NOTE — PATIENT INSTRUCTIONS
Naprosyn 500 mg 1 tablet twice daily x 2 weeks, then can use as needed  Take with food     Ablation with Dr Nash as planned     Continue swimming

## 2025-04-28 NOTE — PROGRESS NOTES
MEDICATION NAME: Tramadol  STRENGTH: 50 mg  LAST FILL DATE: 3/20/25  DATE LAST TAKEN: 25  QUANTITY FILLED: 90  QUANTITY REMAININ  COUNT COMPLETED BY: PIYUSH SINGH MA and MIKAEL CLIFTON LPN      UDS LAST COMPLETED:   CONTROLLED SUBSTANCES AGREEMENT LAST SIGNED:   ORT LAST COMPLETED:  Modified Osw[employer] disability form filled out annually.

## 2025-04-30 LAB
1OH-MIDAZOLAM UR-MCNC: NEGATIVE NG/ML
7AMINOCLONAZEPAM UR-MCNC: NEGATIVE NG/ML
A-OH ALPRAZ UR-MCNC: NEGATIVE NG/ML
A-OH-TRIAZOLAM UR-MCNC: NEGATIVE NG/ML
AMPHETAMINES UR QL: NEGATIVE NG/ML
BARBITURATES UR QL: NEGATIVE NG/ML
BZE UR QL: NEGATIVE NG/ML
CODEINE UR-MCNC: NEGATIVE NG/ML
CREAT UR-MCNC: 45.9 MG/DL
DRUG SCREEN COMMENT UR-IMP: ABNORMAL
EDDP UR-MCNC: NEGATIVE NG/ML
FENTANYL UR-MCNC: NEGATIVE NG/ML
HYDROCODONE UR-MCNC: NEGATIVE NG/ML
HYDROMORPHONE UR-MCNC: NEGATIVE NG/ML
LORAZEPAM UR-MCNC: NEGATIVE NG/ML
METHADONE UR-MCNC: NEGATIVE NG/ML
MORPHINE UR-MCNC: NEGATIVE NG/ML
NORDIAZEPAM UR-MCNC: NEGATIVE NG/ML
NORFENTANYL UR-MCNC: NEGATIVE NG/ML
NORHYDROCODONE UR CFM-MCNC: NEGATIVE NG/ML
NOROXYCODONE UR CFM-MCNC: NEGATIVE NG/ML
NORTRAMADOL UR-MCNC: 1378 NG/ML
OH-ETHYLFLURAZ UR-MCNC: NEGATIVE NG/ML
OXAZEPAM UR-MCNC: NEGATIVE NG/ML
OXIDANTS UR QL: NEGATIVE MCG/ML
OXYCODONE UR CFM-MCNC: NEGATIVE NG/ML
OXYMORPHONE UR CFM-MCNC: NEGATIVE NG/ML
PCP UR QL: NEGATIVE NG/ML
PH UR: 5.5 [PH] (ref 4.5–9)
QUEST 6 ACETYLMORPHINE: NEGATIVE NG/ML
QUEST NOTES AND COMMENTS: ABNORMAL
QUEST ZOLPIDEM: NEGATIVE NG/ML
TEMAZEPAM UR-MCNC: NEGATIVE NG/ML
THC UR QL: NEGATIVE NG/ML
TRAMADOL UR-MCNC: 2912 NG/ML
ZOLPIDEM PHENYL-4-CARB UR CFM-MCNC: NEGATIVE NG/ML

## 2025-05-01 ENCOUNTER — APPOINTMENT (OUTPATIENT)
Dept: RADIOLOGY | Facility: CLINIC | Age: 70
End: 2025-05-01
Payer: MEDICARE

## 2025-05-01 DIAGNOSIS — Z12.31 SCREENING MAMMOGRAM FOR BREAST CANCER: ICD-10-CM

## 2025-05-01 PROCEDURE — 77067 SCR MAMMO BI INCL CAD: CPT | Performed by: RADIOLOGY

## 2025-05-01 PROCEDURE — 77063 BREAST TOMOSYNTHESIS BI: CPT | Performed by: RADIOLOGY

## 2025-05-01 PROCEDURE — 77067 SCR MAMMO BI INCL CAD: CPT

## 2025-05-08 ENCOUNTER — PRE-ADMISSION TESTING (OUTPATIENT)
Dept: PREADMISSION TESTING | Facility: HOSPITAL | Age: 70
End: 2025-05-08
Payer: MEDICARE

## 2025-05-08 VITALS
WEIGHT: 138 LBS | RESPIRATION RATE: 16 BRPM | TEMPERATURE: 96.8 F | BODY MASS INDEX: 23.56 KG/M2 | SYSTOLIC BLOOD PRESSURE: 122 MMHG | DIASTOLIC BLOOD PRESSURE: 70 MMHG | HEIGHT: 64 IN | OXYGEN SATURATION: 96 % | HEART RATE: 64 BPM

## 2025-05-08 DIAGNOSIS — Z01.818 PRE-OP TESTING: Primary | ICD-10-CM

## 2025-05-08 DIAGNOSIS — M54.51 VERTEBROGENIC LOW BACK PAIN: ICD-10-CM

## 2025-05-08 PROCEDURE — 99204 OFFICE O/P NEW MOD 45 MIN: CPT | Performed by: PHYSICIAN ASSISTANT

## 2025-05-08 ASSESSMENT — DUKE ACTIVITY SCORE INDEX (DASI)
DASI METS SCORE: 7.6
TOTAL_SCORE: 39.45
CAN YOU WALK INDOORS, SUCH AS AROUND YOUR HOUSE: YES
CAN YOU WALK A BLOCK OR TWO ON LEVEL GROUND: NO
CAN YOU PARTICIPATE IN STRENOUS SPORTS LIKE SWIMMING, SINGLES TENNIS, FOOTBALL, BASKETBALL, OR SKIING: YES
CAN YOU TAKE CARE OF YOURSELF (EAT, DRESS, BATHE, OR USE TOILET): YES
CAN YOU CLIMB A FLIGHT OF STAIRS OR WALK UP A HILL: YES
CAN YOU HAVE SEXUAL RELATIONS: YES
CAN YOU DO LIGHT WORK AROUND THE HOUSE LIKE DUSTING OR WASHING DISHES: YES
CAN YOU RUN A SHORT DISTANCE: NO
CAN YOU DO HEAVY WORK AROUND THE HOUSE LIKE SCRUBBING FLOORS OR LIFTING AND MOVING HEAVY FURNITURE: NO
CAN YOU DO YARD WORK LIKE RAKING LEAVES, WEEDING OR PUSHING A MOWER: YES
CAN YOU PARTICIPATE IN MODERATE RECREATIONAL ACTIVITIES LIKE GOLF, BOWLING, DANCING, DOUBLES TENNIS OR THROWING A BASEBALL OR FOOTBALL: YES
CAN YOU DO MODERATE WORK AROUND THE HOUSE LIKE VACUUMING, SWEEPING FLOORS OR CARRYING GROCERIES: YES

## 2025-05-08 ASSESSMENT — ENCOUNTER SYMPTOMS
DIARRHEA: 1
CONSTIPATION: 1
ARTHRALGIAS: 1
BACK PAIN: 1

## 2025-05-08 NOTE — PREPROCEDURE INSTRUCTIONS
Medication List            Accurate as of May 8, 2025  2:38 PM. Always use your most recent med list.                methocarbamol 750 mg tablet  Commonly known as: Robaxin  Take 1 tablet (750 mg) by mouth once daily as needed for muscle spasms.  Medication Adjustments for Surgery: Take/Use as prescribed     naproxen 500 mg tablet  Commonly known as: Naprosyn  Take 1 tablet (500 mg) by mouth 2 times a day.  Additional Medication Adjustments for Surgery: Take last dose 7 days before surgery     propranolol 10 mg tablet  Commonly known as: Inderal  Take 1 tablet (10 mg) by mouth once daily as needed (Anxiety causing worsening tremor).  Medication Adjustments for Surgery: Take/Use as prescribed     sertraline 50 mg tablet  Commonly known as: Zoloft  Take 1.5 tablets (75 mg) by mouth once daily for 7 days, THEN 1 tablet (50 mg) once daily for 7 days, THEN 0.5 tablets (25 mg) once daily for 7 days.  Start taking on: April 15, 2025  Medication Adjustments for Surgery: Take/Use as prescribed     traMADol 50 mg tablet  Commonly known as: Ultram  Take 1 tablet (50 mg) by mouth 3 times a day as needed for severe pain (7 - 10). Do not fill before February 19, 2025.  Medication Adjustments for Surgery: Take/Use as prescribed     tretinoin 0.05 % cream  Commonly known as: Retin-A  Medication Adjustments for Surgery: Take/Use as prescribed     valACYclovir 500 mg tablet  Commonly known as: Valtrex  Take 1 tablet (500 mg) by mouth once daily.  Medication Adjustments for Surgery: Take/Use as prescribed                                    Why must I stop eating and drinking near surgery time?  With sedation, food or liquid in your stomach can enter your lungs causing serious complications  Increases nausea and vomiting    When do I need to stop eating and drinking before my surgery?   Do not eat or drink after midnight the night before your surgery/procedure.  You may have small sips of water to take your  medication.            PAT DISCHARGE INSTRUCTIONS    Please call the Same Day Surgery (SDS) Department of the hospital where your procedure will be performed after 2:00 PM the day before your surgery. If you are scheduled on a Monday, or a Tuesday following a Monday holiday, you will need to call on the last business day prior to your surgery.    [facility]  [address] [place] [address] [phone]  [facility]  [address], [address] [phone]  [facility]  [address] [facility] [place] [address]  [phone]    Please let your surgeon know if:      You develop any open sores, shingles, burning or painful urination as these may increase your risk of an infection.   You no longer wish to have the surgery.   Any other personal circumstances change that may lead to the need to cancel or defer this surgery-such as being sick or getting admitted to any hospital within one week of your planned procedure.    Your contact details change, such as a change of address or phone number.    Starting now:     Please DO NOT drink alcohol or smoke for 24 hours before surgery. It is well known that quitting smoking can make a huge difference to your health and recovery from surgery. The longer you abstain from smoking, the better your chances of a healthy recovery. If you need help with quitting, call 8-800-QUIT-NOW to be connected to a trained counselor who will discuss the best methods to help you quit.     Before your surgery:    Please stop all supplements 7 days prior to surgery. Or as directed by your surgeon.   Please stop taking NSAID pain medicine such as Advil and Motrin 7 days before surgery.    If you develop any fever, cough, cold, rashes, cuts, scratches, scrapes, urinary symptoms or infection anywhere on your body (including teeth and gums) prior to  surgery, please call your surgeon’s office as soon as possible. This may require treatment to reduce the chance of cancellation on the day of surgery.    The day before your surgery:   DIET- Please follow the diet instructions at the top of your packet.   Get a good night’s rest.  Use the special soap for bathing if you have been instructed to use one.    Scheduled surgery times may change and you will be notified if this occurs - please check your personal voicemail for any updates.     On the morning of surgery:   Wear comfortable, loose fitting clothes which open in the front. Please do not wear moisturizers, creams, lotions, makeup or perfume.    Please bring with you to surgery:   Photo ID and insurance card   Current list of medicines and allergies   Pacemaker/ Defibrillator/Heart stent cards   CPAP machine and mask    Slings/ splints/ crutches   A copy of your complete advanced directive/DHPOA.    Please do NOT bring with you to surgery:   All jewelry and valuables should be left at home.   Prosthetic devices such as contact lenses, hearing aids, dentures, eyelash extensions, hairpins and body piercings must be removed prior to going in to the surgical suite.    After outpatient surgery:   A responsible adult MUST accompany you at the time of discharge and stay with you for 24 hours after your surgery. You may NOT drive yourself home after surgery.    Do not drive, operate machinery, make critical decisions or do activities that require co-ordination or balance until after a night’s sleep.   Do not drink alcoholic beverages for 24 hours.   Instructions for resuming your medications will be provided by your surgeon.    CALL YOUR DOCTOR AFTER SURGERY IF YOU HAVE:     Chills and/or a fever of 101° F or higher.    Redness, swelling, pus or drainage from your surgical wound or a bad smell from the wound.    Lightheadedness, fainting or confusion.    Persistent vomiting (throwing up) and are not able to eat or  drink for 12 hours.    Three or more loose, watery bowel movements in 24 hours (diarrhea).   Difficulty or pain while urinating( after non-urological surgery)    Pain and swelling in your legs, especially if it is only on one side.    Difficulty breathing or are breathing faster than normal.    Any new concerning symptoms.
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
## 2025-05-19 ENCOUNTER — OFFICE VISIT (OUTPATIENT)
Dept: PAIN MEDICINE | Facility: CLINIC | Age: 70
End: 2025-05-19
Payer: MEDICARE

## 2025-05-19 VITALS — SYSTOLIC BLOOD PRESSURE: 122 MMHG | OXYGEN SATURATION: 98 % | DIASTOLIC BLOOD PRESSURE: 64 MMHG | HEART RATE: 56 BPM

## 2025-05-19 DIAGNOSIS — M54.30 SCIATIC LEG PAIN: ICD-10-CM

## 2025-05-19 DIAGNOSIS — M54.51 VERTEBROGENIC LOW BACK PAIN: ICD-10-CM

## 2025-05-19 DIAGNOSIS — M54.14 THORACIC RADICULITIS: Primary | ICD-10-CM

## 2025-05-19 DIAGNOSIS — Z79.891 LONG TERM (CURRENT) USE OF OPIATE ANALGESIC: ICD-10-CM

## 2025-05-19 PROCEDURE — 1125F AMNT PAIN NOTED PAIN PRSNT: CPT | Performed by: ANESTHESIOLOGY

## 2025-05-19 PROCEDURE — 1159F MED LIST DOCD IN RCRD: CPT | Performed by: ANESTHESIOLOGY

## 2025-05-19 PROCEDURE — 1160F RVW MEDS BY RX/DR IN RCRD: CPT | Performed by: ANESTHESIOLOGY

## 2025-05-19 PROCEDURE — 99214 OFFICE O/P EST MOD 30 MIN: CPT | Performed by: ANESTHESIOLOGY

## 2025-05-19 RX ORDER — TRAMADOL HYDROCHLORIDE 50 MG/1
50 TABLET, FILM COATED ORAL 3 TIMES DAILY PRN
Qty: 90 TABLET | Refills: 2 | Status: SHIPPED | OUTPATIENT
Start: 2025-05-30 | End: 2025-08-28

## 2025-05-19 RX ORDER — NALOXONE HYDROCHLORIDE 4 MG/.1ML
1 SPRAY NASAL AS NEEDED
Qty: 2 EACH | Refills: 0 | Status: SHIPPED | OUTPATIENT
Start: 2025-05-19

## 2025-05-19 ASSESSMENT — ENCOUNTER SYMPTOMS
CONSTITUTIONAL NEGATIVE: 1
RESPIRATORY NEGATIVE: 1
EYES NEGATIVE: 1
ENDOCRINE NEGATIVE: 1
LOSS OF SENSATION IN FEET: 0
HEMATOLOGIC/LYMPHATIC NEGATIVE: 1
OCCASIONAL FEELINGS OF UNSTEADINESS: 0
GASTROINTESTINAL NEGATIVE: 1
PSYCHIATRIC NEGATIVE: 1
BACK PAIN: 1
DEPRESSION: 0
NEUROLOGICAL NEGATIVE: 1
CARDIOVASCULAR NEGATIVE: 1

## 2025-05-19 ASSESSMENT — PAIN SCALES - GENERAL
PAINLEVEL_OUTOF10: 7
PAINLEVEL_OUTOF10: 7

## 2025-05-19 ASSESSMENT — PAIN - FUNCTIONAL ASSESSMENT: PAIN_FUNCTIONAL_ASSESSMENT: 0-10

## 2025-05-19 ASSESSMENT — PAIN DESCRIPTION - DESCRIPTORS: DESCRIPTORS: THROBBING;DULL;ACHING
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
## 2025-05-19 NOTE — PROGRESS NOTES
MEDICATION NAME: tramadol  STRENGTH: 50 mg  LAST FILL DATE:   DATE LAST TAKEN:   QUANTITY FILLED: 90  QUANTITY REMAININ  COUNT COMPLETED BY: MARIA ELENA ANDERSON LPN and MIKAEL CLIFTON LPN

## 2025-05-21 ENCOUNTER — TELEPHONE (OUTPATIENT)
Dept: PAIN MEDICINE | Facility: CLINIC | Age: 70
End: 2025-05-21
Payer: MEDICARE

## 2025-05-21 NOTE — TELEPHONE ENCOUNTER
"Ines called to state she has what she thinks is a \"herpes\" lesion in the crack of her butt. She said she has been cleaning it and thinks it is healing but wants to know if it will affect her procedure scheduled for tomorrow?  Please advise.   "

## 2025-05-21 NOTE — TELEPHONE ENCOUNTER
Patient states that she has a sore spot 2 in lower than her tailbone. It was itching so bad that she took a brillo pad to it then put rubbing alcohol on it. Spoke to Dr. Nash. Called the patient back.     Per Dr. Nash patient needs to be seen and cleared by her PCP then make an appt with our office again to get added back on the schedule. Patient voiced understanding and states that she will call her PCP to schedule.

## 2025-05-22 ENCOUNTER — OFFICE VISIT (OUTPATIENT)
Dept: PRIMARY CARE | Facility: CLINIC | Age: 70
End: 2025-05-22
Payer: MEDICARE

## 2025-05-22 VITALS
HEART RATE: 74 BPM | BODY MASS INDEX: 23.56 KG/M2 | OXYGEN SATURATION: 99 % | SYSTOLIC BLOOD PRESSURE: 118 MMHG | DIASTOLIC BLOOD PRESSURE: 62 MMHG | WEIGHT: 138 LBS | HEIGHT: 64 IN

## 2025-05-22 DIAGNOSIS — Z87.891 FORMER SMOKER: Primary | ICD-10-CM

## 2025-05-22 DIAGNOSIS — Z01.818 PREOP EXAMINATION: ICD-10-CM

## 2025-05-22 DIAGNOSIS — B02.9 HERPES ZOSTER WITHOUT COMPLICATION: ICD-10-CM

## 2025-05-22 PROCEDURE — 99213 OFFICE O/P EST LOW 20 MIN: CPT | Performed by: FAMILY MEDICINE

## 2025-05-22 PROCEDURE — 3008F BODY MASS INDEX DOCD: CPT | Performed by: FAMILY MEDICINE

## 2025-05-22 PROCEDURE — 1125F AMNT PAIN NOTED PAIN PRSNT: CPT | Performed by: FAMILY MEDICINE

## 2025-05-22 RX ORDER — VALACYCLOVIR HYDROCHLORIDE 1 G/1
1000 TABLET, FILM COATED ORAL 3 TIMES DAILY
Qty: 21 TABLET | Refills: 0 | Status: SHIPPED | OUTPATIENT
Start: 2025-05-22 | End: 2025-05-29

## 2025-05-22 RX ORDER — VALACYCLOVIR HYDROCHLORIDE 1 G/1
1000 TABLET, FILM COATED ORAL 3 TIMES DAILY
Qty: 21 TABLET | Refills: 0 | Status: SHIPPED | OUTPATIENT
Start: 2025-05-22 | End: 2025-05-22

## 2025-05-22 RX ORDER — VALACYCLOVIR HYDROCHLORIDE 1 G/1
1000 TABLET, FILM COATED ORAL 3 TIMES DAILY
Qty: 21 TABLET | Refills: 2 | Status: SHIPPED | OUTPATIENT
Start: 2025-05-22 | End: 2025-05-29

## 2025-05-22 ASSESSMENT — PAIN SCALES - GENERAL: PAINLEVEL_OUTOF10: 6

## 2025-05-22 NOTE — PROGRESS NOTES
69-year-old presents to clinic with new complaints    Rash:  Patient has been experiencing routine outbreaks of a small vesicular rash on her intergluteal region on the left side around once every 6 months.  Patient is a history of positive titers HSV 1 and 2 but would only ever get operates on her genitals which she has not had in a long time.  Rash is associated with prodromal symptoms of irritation, burning.  Afterwards the rash develops and then slowly resolves over the course of weeks.  For this most recent outbreak of this rash, patient took a Brillo pad and scrubbed the area deeply.      All pertinent positive symptoms are included in history of present illness.    All other systems have been reviewed and are negative and noncontributory to this patient's current ailments.    Sensitive exam performed today patient declined chaperone  CONSTITUTIONAL - INAD. Not ill appearing.  SKIN -on the left superior gluteal region is a flat macular highly excoriated lesion with an erythematous base.  Possible vesicles but highly excoriated.  No jaundice visualized.  HEENT- Atraumatic, normocephalic, no scleral icterus, external nares are not erythematous and without drainage, no neck masses visualized, oropharynx visualized and is without erythema or exudate  RESP - respiration not labored   CARDIAC - no grade 6 systolic murmurs auscultated  ABDOMEN - nondistended.  NEURO- CNs II-XII grossly intact    1. Former smoker (Primary)    - CT lung screening low dose; Future    2. Herpes zoster without complication  Differential includes HSV outbreak versus shingles.  Will treat as if shingles as patient typically had HSV outbreaks in a different area of the body.    The natural history and course of shingles was discussed at length with the patient.  Discussed that shingles is latent chickenpox virus and recurrence can be common.  Discussed the need for shingles vaccination to prevent recurrence.  Discussed treatments for  shingles including symptomatic treatment with topical steroids, medications to assist with the pain and urticaria associated with the shingles outbreak.  Also discussed the use of antivirals and the appropriate use of these to prevent progression of the shingles outbreak.  Discussed red flag signs and symptoms with the patient including shingles outbreaks on the ear or on the face that could lead to blindness or deafness and the need to treat these outbreaks aggressively.    Patient decided on one or more of the treatments at this time.  - valACYclovir (Valtrex) 1 gram tablet; Take 1 tablet (1,000 mg) by mouth 3 times a day for 7 days.  Dispense: 21 tablet; Refill: 0  - valACYclovir (Valtrex) 1 gram tablet; Take 1 tablet (1,000 mg) by mouth 3 times a day for 7 days.  Dispense: 21 tablet; Refill: 2    3. Preop examination  I do not believe the patient with an active shingles/HSV outbreak would have any contraindication to getting ablation upper back done.  Okay to proceed with ablation

## 2025-05-27 DIAGNOSIS — M54.30 SCIATIC LEG PAIN: ICD-10-CM

## 2025-05-28 RX ORDER — METHOCARBAMOL 750 MG/1
750 TABLET, FILM COATED ORAL DAILY PRN
Qty: 90 TABLET | Refills: 0 | Status: SHIPPED | OUTPATIENT
Start: 2025-05-28

## 2025-05-30 DIAGNOSIS — M79.18 MYOFASCIAL PAIN SYNDROME: ICD-10-CM

## 2025-05-30 PROBLEM — H90.3 BILATERAL SENSORINEURAL HEARING LOSS: Status: RESOLVED | Noted: 2023-09-13 | Resolved: 2025-05-30

## 2025-05-30 RX ORDER — NAPROXEN 500 MG/1
500 TABLET ORAL 2 TIMES DAILY
Qty: 60 TABLET | Refills: 0 | Status: SHIPPED | OUTPATIENT
Start: 2025-05-30 | End: 2025-06-29

## 2025-05-30 NOTE — TELEPHONE ENCOUNTER
Called patient and she stated that it was an accident as she knows you intended it for short term use however, it did really help her. I told her that you would be willing to do 1 refill or she can do OTC.    She requested the 1 refill on the naproxen with the understanding this is the last refill, please and thank you.

## 2025-05-30 NOTE — TELEPHONE ENCOUNTER
Unclear if this was requested by the pharmacy or by the patient.   This was intended for short term use - can refill for 1 additional month or she can use OTC aleve or ibuprofen     Please clarify her preference

## 2025-06-02 ENCOUNTER — TELEPHONE (OUTPATIENT)
Dept: PRIMARY CARE | Facility: CLINIC | Age: 70
End: 2025-06-02
Payer: MEDICARE

## 2025-06-04 ENCOUNTER — APPOINTMENT (OUTPATIENT)
Dept: PAIN MEDICINE | Facility: CLINIC | Age: 70
End: 2025-06-04
Payer: MEDICARE

## 2025-06-04 ENCOUNTER — APPOINTMENT (OUTPATIENT)
Dept: RADIOLOGY | Facility: CLINIC | Age: 70
End: 2025-06-04
Payer: MEDICARE

## 2025-06-09 ENCOUNTER — APPOINTMENT (OUTPATIENT)
Dept: RADIOLOGY | Facility: CLINIC | Age: 70
End: 2025-06-09
Payer: MEDICARE

## 2025-06-11 ENCOUNTER — OFFICE VISIT (OUTPATIENT)
Dept: PRIMARY CARE | Facility: CLINIC | Age: 70
End: 2025-06-11
Payer: MEDICARE

## 2025-06-11 VITALS
HEART RATE: 67 BPM | OXYGEN SATURATION: 99 % | HEIGHT: 64 IN | DIASTOLIC BLOOD PRESSURE: 72 MMHG | SYSTOLIC BLOOD PRESSURE: 138 MMHG | BODY MASS INDEX: 23.22 KG/M2 | WEIGHT: 136 LBS

## 2025-06-11 DIAGNOSIS — K21.00 GASTROESOPHAGEAL REFLUX DISEASE WITH ESOPHAGITIS WITHOUT HEMORRHAGE: ICD-10-CM

## 2025-06-11 DIAGNOSIS — R07.89 ATYPICAL CHEST PAIN: Primary | ICD-10-CM

## 2025-06-11 DIAGNOSIS — R53.83 OTHER FATIGUE: ICD-10-CM

## 2025-06-11 PROCEDURE — 3008F BODY MASS INDEX DOCD: CPT | Performed by: FAMILY MEDICINE

## 2025-06-11 PROCEDURE — 1036F TOBACCO NON-USER: CPT | Performed by: FAMILY MEDICINE

## 2025-06-11 PROCEDURE — 1159F MED LIST DOCD IN RCRD: CPT | Performed by: FAMILY MEDICINE

## 2025-06-11 PROCEDURE — 1125F AMNT PAIN NOTED PAIN PRSNT: CPT | Performed by: FAMILY MEDICINE

## 2025-06-11 PROCEDURE — 99214 OFFICE O/P EST MOD 30 MIN: CPT | Performed by: FAMILY MEDICINE

## 2025-06-11 PROCEDURE — 93010 ELECTROCARDIOGRAM REPORT: CPT | Performed by: FAMILY MEDICINE

## 2025-06-11 PROCEDURE — 93005 ELECTROCARDIOGRAM TRACING: CPT | Performed by: FAMILY MEDICINE

## 2025-06-11 PROCEDURE — 99214 OFFICE O/P EST MOD 30 MIN: CPT | Mod: 25 | Performed by: FAMILY MEDICINE

## 2025-06-11 RX ORDER — MELOXICAM 15 MG/1
15 TABLET ORAL DAILY
Qty: 90 TABLET | Refills: 0 | Status: SHIPPED | OUTPATIENT
Start: 2025-06-11 | End: 2025-09-09

## 2025-06-11 RX ORDER — OMEPRAZOLE 40 MG/1
40 CAPSULE, DELAYED RELEASE ORAL
Qty: 90 CAPSULE | Refills: 0 | Status: SHIPPED | OUTPATIENT
Start: 2025-06-11 | End: 2025-09-09

## 2025-06-11 ASSESSMENT — ENCOUNTER SYMPTOMS
LOSS OF SENSATION IN FEET: 0
DEPRESSION: 0
OCCASIONAL FEELINGS OF UNSTEADINESS: 0

## 2025-06-11 ASSESSMENT — PAIN SCALES - GENERAL: PAINLEVEL_OUTOF10: 9

## 2025-06-11 NOTE — PROGRESS NOTES
69-year presents to clinic with new complaints    Chest pain:  Longstanding persistent history of epigastric/sternal chest pain that radiates to her bilateral posterior rib cage also radiating up into her shoulders.  Worsens with certain movements especially movement of her upper extremities.  Not exertion related.  CT calcium score of 0 and recent history no other risk factors for ASCVD.  Currently following with pain management, differential does include costochondritis and has upcoming thoracic MRI to look for thoracic radiculitis    GERD:  Patient does experience GERD symptoms including burning pain and acid reflux at least once weekly for which she utilizes homeopathic remedies which does help but does not keep the symptoms at bay.  Currently experiencing epigastric and sternal pain fairly regularly    All pertinent positive symptoms are included in history of present illness.    All other systems have been reviewed and are negative and noncontributory to this patient's current ailments.    CONSTITUTIONAL - INAD. Not ill appearing.  SKIN - No lesions or rashes visualized. No jaundice visualized.  HEENT- Atraumatic, normocephalic, no scleral icterus, external nares are not erythematous and without drainage, no neck masses visualized, oropharynx visualized and is without erythema or exudate  RESP - respiration not labored   CARDIAC - no grade 6 systolic murmurs auscultated  ABDOMEN - nondistended.  NEURO- CNs II-XII grossly intact  MUSCULOSKELETAL-tenderness to palpation along the costochondral junction bilaterally at the lower sternum    1. Atypical chest pain (Primary)  Likely costochondritis or other radiculopathy/thoracic pain.  EKG performed in office today with no changes from previous, NSR.  Out of abundance of caution will refer to cardiology for further testing if needed.  Due to chronic NSAID use recommend against daily naproxen can trial meloxicam.  PPI as below  - ECG 12 lead (Clinic Performed)  -  Referral to Cardiology; Future  - meloxicam (Mobic) 15 mg tablet; Take 1 tablet (15 mg) by mouth once daily.  Dispense: 90 tablet; Refill: 0    2. Other fatigue    - Referral to Cardiology; Future    3. Gastroesophageal reflux disease with esophagitis without hemorrhage  Spoke about the natural history and course of gastroesophageal reflux disease.  Spoke about the different causes including anatomic causes, physiologic causes, and lifestyle causes.    Had a discussion with the patient about the need for close monitoring and treatment of GERD and that longstanding GERD can cause changes to the cell types and esophagus which is known as Ribera's esophagus and can lead to higher rates of esophageal carcinoma in the future.  Spoke with the need for EGD to evaluate for these disorders and to obtain biopsies for possible different causes of recurrent GERD.    Spoke with different treatments for GERD including lifestyle interventions and dietary interventions including low acid diet.  Spoke about the different acid reducing medications and the cost benefits of these medications including proton pump inhibitors such as omeprazole and pantoprazole as well as antihistamines such as famotidine.    Patient made an informed decision about 1 or more of these treatments at this time.  - omeprazole (PriLOSEC) 40 mg DR capsule; Take 1 capsule (40 mg) by mouth once daily in the morning. Take before meals. Do not crush or chew.  Dispense: 90 capsule; Refill: 0

## 2025-06-16 ENCOUNTER — HOSPITAL ENCOUNTER (OUTPATIENT)
Dept: RADIOLOGY | Facility: CLINIC | Age: 70
End: 2025-06-16
Payer: MEDICARE

## 2025-06-19 ENCOUNTER — PATIENT MESSAGE (OUTPATIENT)
Dept: PRIMARY CARE | Facility: CLINIC | Age: 70
End: 2025-06-19
Payer: MEDICARE

## 2025-06-19 DIAGNOSIS — F32.A ANXIETY AND DEPRESSION: Primary | ICD-10-CM

## 2025-06-19 DIAGNOSIS — F41.9 ANXIETY AND DEPRESSION: Primary | ICD-10-CM

## 2025-06-20 RX ORDER — SERTRALINE HYDROCHLORIDE 100 MG/1
100 TABLET, FILM COATED ORAL DAILY
Qty: 90 TABLET | Refills: 3 | Status: SHIPPED | OUTPATIENT
Start: 2025-06-20 | End: 2026-06-20

## 2025-06-23 ENCOUNTER — APPOINTMENT (OUTPATIENT)
Dept: RADIOLOGY | Facility: CLINIC | Age: 70
End: 2025-06-23
Payer: MEDICARE

## 2025-07-01 ENCOUNTER — OFFICE VISIT (OUTPATIENT)
Dept: OBSTETRICS AND GYNECOLOGY | Facility: CLINIC | Age: 70
End: 2025-07-01
Payer: MEDICARE

## 2025-07-01 VITALS
SYSTOLIC BLOOD PRESSURE: 108 MMHG | DIASTOLIC BLOOD PRESSURE: 71 MMHG | WEIGHT: 135.6 LBS | HEIGHT: 64 IN | BODY MASS INDEX: 23.15 KG/M2

## 2025-07-01 DIAGNOSIS — Z01.419 ENCOUNTER FOR ANNUAL ROUTINE GYNECOLOGICAL EXAMINATION: ICD-10-CM

## 2025-07-01 DIAGNOSIS — Z01.42 PAP SMEAR OF CERVIX TO CONFIRM NORMAL SMEAR FOLLOWING ABNORMAL SMEAR: ICD-10-CM

## 2025-07-01 DIAGNOSIS — Z12.31 ENCOUNTER FOR SCREENING MAMMOGRAM FOR MALIGNANT NEOPLASM OF BREAST: ICD-10-CM

## 2025-07-01 DIAGNOSIS — M85.89 OSTEOPENIA OF MULTIPLE SITES: ICD-10-CM

## 2025-07-01 DIAGNOSIS — Z78.0 MENOPAUSE: Primary | ICD-10-CM

## 2025-07-01 PROCEDURE — 1160F RVW MEDS BY RX/DR IN RCRD: CPT

## 2025-07-01 PROCEDURE — 1159F MED LIST DOCD IN RCRD: CPT

## 2025-07-01 PROCEDURE — 1036F TOBACCO NON-USER: CPT

## 2025-07-01 PROCEDURE — 3008F BODY MASS INDEX DOCD: CPT

## 2025-07-01 PROCEDURE — 99213 OFFICE O/P EST LOW 20 MIN: CPT

## 2025-07-01 PROCEDURE — 1126F AMNT PAIN NOTED NONE PRSNT: CPT

## 2025-07-01 ASSESSMENT — LIFESTYLE VARIABLES
HOW MANY STANDARD DRINKS CONTAINING ALCOHOL DO YOU HAVE ON A TYPICAL DAY: PATIENT DOES NOT DRINK
SKIP TO QUESTIONS 9-10: 1
HOW OFTEN DO YOU HAVE SIX OR MORE DRINKS ON ONE OCCASION: NEVER
HOW OFTEN DO YOU HAVE A DRINK CONTAINING ALCOHOL: NEVER
AUDIT-C TOTAL SCORE: 0

## 2025-07-01 ASSESSMENT — ENCOUNTER SYMPTOMS
FATIGUE: 0
DEPRESSION: 0
CHILLS: 0
DIZZINESS: 0
DYSURIA: 0
SHORTNESS OF BREATH: 0
FEVER: 0
VOMITING: 0
OCCASIONAL FEELINGS OF UNSTEADINESS: 0
COLOR CHANGE: 0
COUGH: 0
UNEXPECTED WEIGHT CHANGE: 0
HEADACHES: 0
LOSS OF SENSATION IN FEET: 0
NAUSEA: 0
ABDOMINAL PAIN: 0

## 2025-07-01 ASSESSMENT — PATIENT HEALTH QUESTIONNAIRE - PHQ9
SUM OF ALL RESPONSES TO PHQ9 QUESTIONS 1 & 2: 0
2. FEELING DOWN, DEPRESSED OR HOPELESS: NOT AT ALL
1. LITTLE INTEREST OR PLEASURE IN DOING THINGS: NOT AT ALL

## 2025-07-01 ASSESSMENT — PAIN SCALES - GENERAL: PAINLEVEL_OUTOF10: 0-NO PAIN

## 2025-07-01 NOTE — PROGRESS NOTES
"Subjective   Ambar M Moviel \"Belinda\" is a 69 y.o. female who is here for a routine menopausal exam. Last saw her 2024.   - Overall OK; her dog Diane is 15 yo and experiencing health issues/pancreatitis, grieving this.  - Denies vaginal bleeding. Denies pelvic pain, pressure, or bloating.  - Denies breast or vaginal concerns.   - Having some left sided hip pain; having back surgery done and planning to see if this helps with her pain.    Complaints:   none  HRT: no  History of abnormal Pap smear: yes   - 2022 ASCUS, HPV positive;  - 3/2022 colposcopy with no bx or ECC  - 2023 negative cytology, pos HPV other  - 2204 negative cytology/neg HPV   History of abnormal mammogram: no      OB History          4    Para   1    Term   1            AB   3    Living   1         SAB   3    IAB        Ectopic        Multiple        Live Births   1                  Review of Systems   Constitutional:  Negative for chills, fatigue, fever and unexpected weight change.   Respiratory:  Negative for cough and shortness of breath.    Gastrointestinal:  Negative for abdominal pain, nausea and vomiting.   Genitourinary:  Negative for dyspareunia, dysuria, pelvic pain and vaginal discharge.   Skin:  Negative for color change and rash.   Neurological:  Negative for dizziness and headaches.       Objective   /71   Ht 1.626 m (5' 4\")   Wt 61.5 kg (135 lb 9.6 oz)   BMI 23.28 kg/m²        General:   Alert and oriented, in no acute distress   Neck: Supple. No visible thyromegaly.    Breast/Axilla: Normal to palpation bilaterally without masses, skin changes, or nipple discharge.    Abdomen: Soft, non-tender, without masses or organomegaly   Vulva: Normal architecture without erythema, masses, or lesions.    Vagina: Normal mucosa without lesions, masses, or atrophy. No abnormal vaginal discharge.    Cervix: Normal without masses, lesions, or signs of cervicitis; pap performed    Uterus: Normal, mobile, non-enlarged " uterus   Adnexa: Normal without masses or lesions   Pelvic Floor Normal    Psych Normal affect. Normal mood.      Assessment/Plan   Diagnoses and all orders for this visit:  Encounter for annual routine gynecological examination  -     THINPREP PAP TEST  - UTD on colonoscopy, 2022.  Pap smear of cervix to confirm normal smear following abnormal smear  -     THINPREP PAP TEST  Encounter for screening mammogram for malignant neoplasm of breast  -     BI mammo bilateral screening tomosynthesis; Future  - UTD on mammogram 5/1/25 benign. Order for 5/2026 placed.  Osteopenia of multiple sites  - UTD on DEXA 6/2024 osteopenia of femur neck/hip; next due 6/2026.    Mireya Puente PA-C

## 2025-07-16 LAB

## 2025-08-05 DIAGNOSIS — M54.30 SCIATIC LEG PAIN: ICD-10-CM

## 2025-08-06 RX ORDER — METHOCARBAMOL 750 MG/1
750 TABLET, FILM COATED ORAL DAILY PRN
Qty: 90 TABLET | Refills: 0 | Status: SHIPPED | OUTPATIENT
Start: 2025-08-06

## 2025-08-14 ENCOUNTER — PRE-ADMISSION TESTING (OUTPATIENT)
Dept: PREADMISSION TESTING | Facility: HOSPITAL | Age: 70
End: 2025-08-14
Payer: MEDICARE

## 2025-08-14 VITALS
OXYGEN SATURATION: 98 % | RESPIRATION RATE: 16 BRPM | TEMPERATURE: 96.8 F | BODY MASS INDEX: 23.56 KG/M2 | HEART RATE: 59 BPM | DIASTOLIC BLOOD PRESSURE: 78 MMHG | HEIGHT: 64 IN | SYSTOLIC BLOOD PRESSURE: 136 MMHG | WEIGHT: 138 LBS

## 2025-08-14 DIAGNOSIS — M54.51 VERTEBROGENIC LOW BACK PAIN: ICD-10-CM

## 2025-08-14 DIAGNOSIS — Z01.818 PRE-OP TESTING: Primary | ICD-10-CM

## 2025-08-14 LAB
ANION GAP SERPL CALCULATED.3IONS-SCNC: 9 MMOL/L (ref 10–20)
BASOPHILS # BLD AUTO: 0.1 X10*3/UL (ref 0–0.1)
BASOPHILS NFR BLD AUTO: 1.2 %
BUN SERPL-MCNC: 26 MG/DL (ref 6–23)
CALCIUM SERPL-MCNC: 9.4 MG/DL (ref 8.6–10.3)
CHLORIDE SERPL-SCNC: 105 MMOL/L (ref 98–107)
CO2 SERPL-SCNC: 30 MMOL/L (ref 21–32)
CREAT SERPL-MCNC: 0.77 MG/DL (ref 0.5–1.05)
EGFRCR SERPLBLD CKD-EPI 2021: 84 ML/MIN/1.73M*2
EOSINOPHIL # BLD AUTO: 0.27 X10*3/UL (ref 0–0.7)
EOSINOPHIL NFR BLD AUTO: 3.4 %
ERYTHROCYTE [DISTWIDTH] IN BLOOD BY AUTOMATED COUNT: 12.9 % (ref 11.5–14.5)
GLUCOSE SERPL-MCNC: 84 MG/DL (ref 74–99)
HCT VFR BLD AUTO: 41.3 % (ref 36–46)
HGB BLD-MCNC: 13.4 G/DL (ref 12–16)
IMM GRANULOCYTES # BLD AUTO: 0.03 X10*3/UL (ref 0–0.7)
IMM GRANULOCYTES NFR BLD AUTO: 0.4 % (ref 0–0.9)
LYMPHOCYTES # BLD AUTO: 2.88 X10*3/UL (ref 1.2–4.8)
LYMPHOCYTES NFR BLD AUTO: 36 %
MCH RBC QN AUTO: 28.8 PG (ref 26–34)
MCHC RBC AUTO-ENTMCNC: 32.4 G/DL (ref 32–36)
MCV RBC AUTO: 89 FL (ref 80–100)
MONOCYTES # BLD AUTO: 0.75 X10*3/UL (ref 0.1–1)
MONOCYTES NFR BLD AUTO: 9.4 %
NEUTROPHILS # BLD AUTO: 3.98 X10*3/UL (ref 1.2–7.7)
NEUTROPHILS NFR BLD AUTO: 49.6 %
NRBC BLD-RTO: 0 /100 WBCS (ref 0–0)
PLATELET # BLD AUTO: 262 X10*3/UL (ref 150–450)
POTASSIUM SERPL-SCNC: 4.4 MMOL/L (ref 3.5–5.3)
RBC # BLD AUTO: 4.66 X10*6/UL (ref 4–5.2)
SODIUM SERPL-SCNC: 140 MMOL/L (ref 136–145)
WBC # BLD AUTO: 8 X10*3/UL (ref 4.4–11.3)

## 2025-08-14 PROCEDURE — 80048 BASIC METABOLIC PNL TOTAL CA: CPT

## 2025-08-14 PROCEDURE — 36415 COLL VENOUS BLD VENIPUNCTURE: CPT

## 2025-08-14 PROCEDURE — 99204 OFFICE O/P NEW MOD 45 MIN: CPT | Performed by: PHYSICIAN ASSISTANT

## 2025-08-14 PROCEDURE — 85025 COMPLETE CBC W/AUTO DIFF WBC: CPT

## 2025-08-14 ASSESSMENT — DUKE ACTIVITY SCORE INDEX (DASI)
CAN YOU DO HEAVY WORK AROUND THE HOUSE LIKE SCRUBBING FLOORS OR LIFTING AND MOVING HEAVY FURNITURE: NO
CAN YOU WALK A BLOCK OR TWO ON LEVEL GROUND: NO
CAN YOU DO LIGHT WORK AROUND THE HOUSE LIKE DUSTING OR WASHING DISHES: YES
CAN YOU RUN A SHORT DISTANCE: NO
DASI METS SCORE: 6.3
CAN YOU PARTICIPATE IN MODERATE RECREATIONAL ACTIVITIES LIKE GOLF, BOWLING, DANCING, DOUBLES TENNIS OR THROWING A BASEBALL OR FOOTBALL: NO
CAN YOU HAVE SEXUAL RELATIONS: YES
CAN YOU DO YARD WORK LIKE RAKING LEAVES, WEEDING OR PUSHING A MOWER: NO
CAN YOU WALK INDOORS, SUCH AS AROUND YOUR HOUSE: YES
CAN YOU DO MODERATE WORK AROUND THE HOUSE LIKE VACUUMING, SWEEPING FLOORS OR CARRYING GROCERIES: YES
CAN YOU CLIMB A FLIGHT OF STAIRS OR WALK UP A HILL: YES
TOTAL_SCORE: 28.95
CAN YOU TAKE CARE OF YOURSELF (EAT, DRESS, BATHE, OR USE TOILET): YES
CAN YOU PARTICIPATE IN STRENOUS SPORTS LIKE SWIMMING, SINGLES TENNIS, FOOTBALL, BASKETBALL, OR SKIING: YES

## 2025-08-14 ASSESSMENT — ENCOUNTER SYMPTOMS
PSYCHIATRIC NEGATIVE: 1
ARTHRALGIAS: 1
RESPIRATORY NEGATIVE: 1
BACK PAIN: 1
CARDIOVASCULAR NEGATIVE: 1
CONSTIPATION: 1
NEUROLOGICAL NEGATIVE: 1
CONSTITUTIONAL NEGATIVE: 1
EYES NEGATIVE: 1

## 2025-08-22 ENCOUNTER — OFFICE VISIT (OUTPATIENT)
Dept: PAIN MEDICINE | Facility: CLINIC | Age: 70
End: 2025-08-22
Payer: MEDICARE

## 2025-08-22 VITALS
WEIGHT: 138 LBS | BODY MASS INDEX: 23.56 KG/M2 | SYSTOLIC BLOOD PRESSURE: 109 MMHG | HEIGHT: 64 IN | HEART RATE: 62 BPM | DIASTOLIC BLOOD PRESSURE: 65 MMHG | OXYGEN SATURATION: 99 %

## 2025-08-22 DIAGNOSIS — Z79.891 LONG TERM (CURRENT) USE OF OPIATE ANALGESIC: ICD-10-CM

## 2025-08-22 DIAGNOSIS — M54.51 VERTEBROGENIC LOW BACK PAIN: Primary | ICD-10-CM

## 2025-08-22 DIAGNOSIS — M54.30 SCIATIC LEG PAIN: ICD-10-CM

## 2025-08-22 DIAGNOSIS — M94.0 COSTOCHONDRITIS: ICD-10-CM

## 2025-08-22 PROCEDURE — 1125F AMNT PAIN NOTED PAIN PRSNT: CPT | Performed by: ANESTHESIOLOGY

## 2025-08-22 PROCEDURE — 3008F BODY MASS INDEX DOCD: CPT | Performed by: ANESTHESIOLOGY

## 2025-08-22 PROCEDURE — 1160F RVW MEDS BY RX/DR IN RCRD: CPT | Performed by: ANESTHESIOLOGY

## 2025-08-22 PROCEDURE — 99214 OFFICE O/P EST MOD 30 MIN: CPT | Performed by: ANESTHESIOLOGY

## 2025-08-22 PROCEDURE — 1159F MED LIST DOCD IN RCRD: CPT | Performed by: ANESTHESIOLOGY

## 2025-08-22 PROCEDURE — 99213 OFFICE O/P EST LOW 20 MIN: CPT

## 2025-08-22 RX ORDER — TRAMADOL HYDROCHLORIDE 50 MG/1
50 TABLET, FILM COATED ORAL 3 TIMES DAILY PRN
Qty: 90 TABLET | Refills: 2 | Status: SHIPPED | OUTPATIENT
Start: 2025-09-04 | End: 2025-12-03

## 2025-08-22 ASSESSMENT — ENCOUNTER SYMPTOMS
CONSTITUTIONAL NEGATIVE: 1
ENDOCRINE NEGATIVE: 1
PSYCHIATRIC NEGATIVE: 1
RESPIRATORY NEGATIVE: 1
CARDIOVASCULAR NEGATIVE: 1
NEUROLOGICAL NEGATIVE: 1
EYES NEGATIVE: 1
BACK PAIN: 1
HEMATOLOGIC/LYMPHATIC NEGATIVE: 1
GASTROINTESTINAL NEGATIVE: 1

## 2025-08-22 ASSESSMENT — PATIENT HEALTH QUESTIONNAIRE - PHQ9
SUM OF ALL RESPONSES TO PHQ9 QUESTIONS 1 AND 2: 0
1. LITTLE INTEREST OR PLEASURE IN DOING THINGS: NOT AT ALL
2. FEELING DOWN, DEPRESSED OR HOPELESS: NOT AT ALL

## 2025-08-22 ASSESSMENT — PAIN SCALES - GENERAL
PAINLEVEL_OUTOF10: 10-WORST PAIN EVER
PAINLEVEL_OUTOF10: 1

## 2025-08-22 ASSESSMENT — PAIN DESCRIPTION - DESCRIPTORS: DESCRIPTORS: ACHING

## 2025-08-22 ASSESSMENT — PAIN - FUNCTIONAL ASSESSMENT: PAIN_FUNCTIONAL_ASSESSMENT: 0-10

## 2025-08-25 ENCOUNTER — OFFICE VISIT (OUTPATIENT)
Dept: CARDIOLOGY | Facility: CLINIC | Age: 70
End: 2025-08-25
Payer: MEDICARE

## 2025-08-25 VITALS
WEIGHT: 137.7 LBS | HEART RATE: 49 BPM | DIASTOLIC BLOOD PRESSURE: 79 MMHG | BODY MASS INDEX: 23.51 KG/M2 | HEIGHT: 64 IN | OXYGEN SATURATION: 97 % | SYSTOLIC BLOOD PRESSURE: 123 MMHG

## 2025-08-25 DIAGNOSIS — M94.0 COSTOCHONDRITIS: Primary | ICD-10-CM

## 2025-08-25 DIAGNOSIS — R94.31 ABNORMAL ELECTROCARDIOGRAM (ECG) (EKG): ICD-10-CM

## 2025-08-25 PROCEDURE — 99204 OFFICE O/P NEW MOD 45 MIN: CPT | Performed by: NURSE PRACTITIONER

## 2025-08-25 PROCEDURE — 3008F BODY MASS INDEX DOCD: CPT | Performed by: NURSE PRACTITIONER

## 2025-08-25 PROCEDURE — 99212 OFFICE O/P EST SF 10 MIN: CPT | Performed by: NURSE PRACTITIONER

## 2025-08-25 PROCEDURE — 1125F AMNT PAIN NOTED PAIN PRSNT: CPT | Performed by: NURSE PRACTITIONER

## 2025-08-25 PROCEDURE — 1160F RVW MEDS BY RX/DR IN RCRD: CPT | Performed by: NURSE PRACTITIONER

## 2025-08-25 PROCEDURE — 1159F MED LIST DOCD IN RCRD: CPT | Performed by: NURSE PRACTITIONER

## 2025-08-25 ASSESSMENT — ENCOUNTER SYMPTOMS
MUSCULOSKELETAL NEGATIVE: 1
CARDIOVASCULAR NEGATIVE: 1
NEUROLOGICAL NEGATIVE: 1
RESPIRATORY NEGATIVE: 1
GASTROINTESTINAL NEGATIVE: 1
CONSTITUTIONAL NEGATIVE: 1

## 2025-08-25 ASSESSMENT — PAIN SCALES - GENERAL: PAINLEVEL_OUTOF10: 2

## 2025-08-28 ENCOUNTER — HOSPITAL ENCOUNTER (OUTPATIENT)
Facility: HOSPITAL | Age: 70
Setting detail: OUTPATIENT SURGERY
Discharge: HOME | End: 2025-08-28
Attending: ANESTHESIOLOGY | Admitting: ANESTHESIOLOGY
Payer: MEDICARE

## 2025-08-28 ENCOUNTER — ANESTHESIA EVENT (OUTPATIENT)
Dept: OPERATING ROOM | Facility: HOSPITAL | Age: 70
End: 2025-08-28
Payer: MEDICARE

## 2025-08-28 ENCOUNTER — ANESTHESIA (OUTPATIENT)
Dept: OPERATING ROOM | Facility: HOSPITAL | Age: 70
End: 2025-08-28
Payer: MEDICARE

## 2025-08-28 ENCOUNTER — APPOINTMENT (OUTPATIENT)
Dept: RADIOLOGY | Facility: HOSPITAL | Age: 70
End: 2025-08-28
Payer: MEDICARE

## 2025-08-28 ENCOUNTER — APPOINTMENT (OUTPATIENT)
Dept: OPHTHALMOLOGY | Facility: CLINIC | Age: 70
End: 2025-08-28
Payer: MEDICARE

## 2025-08-28 ENCOUNTER — PHARMACY VISIT (OUTPATIENT)
Dept: PHARMACY | Facility: CLINIC | Age: 70
End: 2025-08-28
Payer: COMMERCIAL

## 2025-08-28 VITALS
WEIGHT: 138.01 LBS | RESPIRATION RATE: 15 BRPM | HEART RATE: 58 BPM | BODY MASS INDEX: 23.69 KG/M2 | DIASTOLIC BLOOD PRESSURE: 68 MMHG | SYSTOLIC BLOOD PRESSURE: 137 MMHG | TEMPERATURE: 97.2 F | OXYGEN SATURATION: 95 %

## 2025-08-28 DIAGNOSIS — M54.51 VERTEBROGENIC LOW BACK PAIN: Primary | ICD-10-CM

## 2025-08-28 DIAGNOSIS — G89.18 POSTOPERATIVE PAIN: ICD-10-CM

## 2025-08-28 PROBLEM — F17.210 SMOKING GREATER THAN 40 PACK YEARS: Status: ACTIVE | Noted: 2025-08-28

## 2025-08-28 PROBLEM — Z86.69 HISTORY OF MIGRAINE HEADACHES: Status: ACTIVE | Noted: 2025-08-28

## 2025-08-28 PROBLEM — K21.9 GASTROESOPHAGEAL REFLUX DISEASE: Status: ACTIVE | Noted: 2025-08-28

## 2025-08-28 PROBLEM — R06.09 DYSPNEA ON EXERTION: Status: ACTIVE | Noted: 2025-08-28

## 2025-08-28 PROBLEM — K58.9 IRRITABLE BOWEL SYNDROME: Status: ACTIVE | Noted: 2025-08-28

## 2025-08-28 PROCEDURE — RXMED WILLOW AMBULATORY MEDICATION CHARGE

## 2025-08-28 PROCEDURE — 3700000001 HC GENERAL ANESTHESIA TIME - INITIAL BASE CHARGE: Performed by: ANESTHESIOLOGY

## 2025-08-28 PROCEDURE — 2500000004 HC RX 250 GENERAL PHARMACY W/ HCPCS (ALT 636 FOR OP/ED): Performed by: ANESTHESIOLOGY

## 2025-08-28 PROCEDURE — 2500000004 HC RX 250 GENERAL PHARMACY W/ HCPCS (ALT 636 FOR OP/ED): Mod: JZ | Performed by: ANESTHESIOLOGY

## 2025-08-28 PROCEDURE — 3700000002 HC GENERAL ANESTHESIA TIME - EACH INCREMENTAL 1 MINUTE: Performed by: ANESTHESIOLOGY

## 2025-08-28 PROCEDURE — 3600000007 HC OR TIME - EACH INCREMENTAL 1 MINUTE - PROCEDURE LEVEL TWO: Performed by: ANESTHESIOLOGY

## 2025-08-28 PROCEDURE — 7100000009 HC PHASE TWO TIME - INITIAL BASE CHARGE: Performed by: ANESTHESIOLOGY

## 2025-08-28 PROCEDURE — 3600000002 HC OR TIME - INITIAL BASE CHARGE - PROCEDURE LEVEL TWO: Performed by: ANESTHESIOLOGY

## 2025-08-28 PROCEDURE — 64628 TRML DSTRJ IOS BVN 1ST 2 L/S: CPT | Performed by: ANESTHESIOLOGY

## 2025-08-28 PROCEDURE — 64629 TRML DSTRJ IOS BVN EA ADDL: CPT | Performed by: ANESTHESIOLOGY

## 2025-08-28 PROCEDURE — 7100000002 HC RECOVERY ROOM TIME - EACH INCREMENTAL 1 MINUTE: Performed by: ANESTHESIOLOGY

## 2025-08-28 PROCEDURE — 7100000001 HC RECOVERY ROOM TIME - INITIAL BASE CHARGE: Performed by: ANESTHESIOLOGY

## 2025-08-28 PROCEDURE — 7100000010 HC PHASE TWO TIME - EACH INCREMENTAL 1 MINUTE: Performed by: ANESTHESIOLOGY

## 2025-08-28 PROCEDURE — 2500000004 HC RX 250 GENERAL PHARMACY W/ HCPCS (ALT 636 FOR OP/ED): Performed by: ANESTHESIOLOGIST ASSISTANT

## 2025-08-28 PROCEDURE — A64628 PR THERMAL DSTRJ INTRAOSSEOUS BVN 1ST 2 LMBR/SAC: Performed by: ANESTHESIOLOGY

## 2025-08-28 PROCEDURE — 2720000007 HC OR 272 NO HCPCS: Performed by: ANESTHESIOLOGY

## 2025-08-28 PROCEDURE — A64628 PR THERMAL DSTRJ INTRAOSSEOUS BVN 1ST 2 LMBR/SAC: Performed by: ANESTHESIOLOGIST ASSISTANT

## 2025-08-28 RX ORDER — HYDROMORPHONE HYDROCHLORIDE 0.2 MG/ML
0.1 INJECTION INTRAMUSCULAR; INTRAVENOUS; SUBCUTANEOUS EVERY 5 MIN PRN
Status: DISCONTINUED | OUTPATIENT
Start: 2025-08-28 | End: 2025-08-28 | Stop reason: HOSPADM

## 2025-08-28 RX ORDER — DEXMEDETOMIDINE IN 0.9 % NACL 20 MCG/5ML
SYRINGE (ML) INTRAVENOUS AS NEEDED
Status: DISCONTINUED | OUTPATIENT
Start: 2025-08-28 | End: 2025-08-28

## 2025-08-28 RX ORDER — HYDROMORPHONE HYDROCHLORIDE 0.2 MG/ML
0.2 INJECTION INTRAMUSCULAR; INTRAVENOUS; SUBCUTANEOUS EVERY 5 MIN PRN
Status: DISCONTINUED | OUTPATIENT
Start: 2025-08-28 | End: 2025-08-28 | Stop reason: HOSPADM

## 2025-08-28 RX ORDER — HYDRALAZINE HYDROCHLORIDE 20 MG/ML
5 INJECTION INTRAMUSCULAR; INTRAVENOUS EVERY 30 MIN PRN
Status: DISCONTINUED | OUTPATIENT
Start: 2025-08-28 | End: 2025-08-28 | Stop reason: HOSPADM

## 2025-08-28 RX ORDER — MIDAZOLAM HYDROCHLORIDE 1 MG/ML
INJECTION, SOLUTION INTRAMUSCULAR; INTRAVENOUS AS NEEDED
Status: DISCONTINUED | OUTPATIENT
Start: 2025-08-28 | End: 2025-08-28

## 2025-08-28 RX ORDER — BUPIVACAINE HYDROCHLORIDE 2.5 MG/ML
INJECTION, SOLUTION EPIDURAL; INFILTRATION; INTRACAUDAL; PERINEURAL AS NEEDED
Status: DISCONTINUED | OUTPATIENT
Start: 2025-08-28 | End: 2025-08-28 | Stop reason: HOSPADM

## 2025-08-28 RX ORDER — ALBUTEROL SULFATE 0.83 MG/ML
2.5 SOLUTION RESPIRATORY (INHALATION) ONCE AS NEEDED
Status: DISCONTINUED | OUTPATIENT
Start: 2025-08-28 | End: 2025-08-28 | Stop reason: HOSPADM

## 2025-08-28 RX ORDER — LIDOCAINE HYDROCHLORIDE 10 MG/ML
0.1 INJECTION, SOLUTION INFILTRATION; PERINEURAL ONCE
Status: DISCONTINUED | OUTPATIENT
Start: 2025-08-28 | End: 2025-08-28 | Stop reason: HOSPADM

## 2025-08-28 RX ORDER — HYDROCODONE BITARTRATE AND ACETAMINOPHEN 5; 325 MG/1; MG/1
1 TABLET ORAL 3 TIMES DAILY PRN
Qty: 9 TABLET | Refills: 0 | Status: SHIPPED | OUTPATIENT
Start: 2025-08-28 | End: 2025-08-31

## 2025-08-28 RX ORDER — SODIUM CHLORIDE, SODIUM LACTATE, POTASSIUM CHLORIDE, CALCIUM CHLORIDE 600; 310; 30; 20 MG/100ML; MG/100ML; MG/100ML; MG/100ML
INJECTION, SOLUTION INTRAVENOUS CONTINUOUS PRN
Status: DISCONTINUED | OUTPATIENT
Start: 2025-08-28 | End: 2025-08-28

## 2025-08-28 RX ORDER — PROPOFOL 10 MG/ML
INJECTION, EMULSION INTRAVENOUS AS NEEDED
Status: DISCONTINUED | OUTPATIENT
Start: 2025-08-28 | End: 2025-08-28

## 2025-08-28 RX ORDER — PHENYLEPHRINE HCL IN 0.9% NACL 1 MG/10 ML
SYRINGE (ML) INTRAVENOUS AS NEEDED
Status: DISCONTINUED | OUTPATIENT
Start: 2025-08-28 | End: 2025-08-28

## 2025-08-28 RX ORDER — CEFAZOLIN SODIUM 2 G/100ML
2 INJECTION, SOLUTION INTRAVENOUS ONCE
Status: COMPLETED | OUTPATIENT
Start: 2025-08-28 | End: 2025-08-28

## 2025-08-28 RX ORDER — ONDANSETRON HYDROCHLORIDE 2 MG/ML
INJECTION, SOLUTION INTRAVENOUS AS NEEDED
Status: DISCONTINUED | OUTPATIENT
Start: 2025-08-28 | End: 2025-08-28

## 2025-08-28 RX ORDER — ONDANSETRON HYDROCHLORIDE 2 MG/ML
4 INJECTION, SOLUTION INTRAVENOUS ONCE AS NEEDED
Status: DISCONTINUED | OUTPATIENT
Start: 2025-08-28 | End: 2025-08-28 | Stop reason: HOSPADM

## 2025-08-28 RX ORDER — LIDOCAINE HYDROCHLORIDE AND EPINEPHRINE 10; 10 UG/ML; MG/ML
INJECTION, SOLUTION INFILTRATION; PERINEURAL AS NEEDED
Status: DISCONTINUED | OUTPATIENT
Start: 2025-08-28 | End: 2025-08-28 | Stop reason: HOSPADM

## 2025-08-28 RX ORDER — LIDOCAINE HYDROCHLORIDE 20 MG/ML
INJECTION, SOLUTION EPIDURAL; INFILTRATION; INTRACAUDAL; PERINEURAL AS NEEDED
Status: DISCONTINUED | OUTPATIENT
Start: 2025-08-28 | End: 2025-08-28

## 2025-08-28 RX ORDER — SODIUM CHLORIDE, SODIUM LACTATE, POTASSIUM CHLORIDE, CALCIUM CHLORIDE 600; 310; 30; 20 MG/100ML; MG/100ML; MG/100ML; MG/100ML
100 INJECTION, SOLUTION INTRAVENOUS CONTINUOUS
Status: DISCONTINUED | OUTPATIENT
Start: 2025-08-28 | End: 2025-08-28 | Stop reason: HOSPADM

## 2025-08-28 RX ADMIN — Medication 50 MCG: at 13:19

## 2025-08-28 RX ADMIN — SODIUM CHLORIDE, POTASSIUM CHLORIDE, SODIUM LACTATE AND CALCIUM CHLORIDE: 600; 310; 30; 20 INJECTION, SOLUTION INTRAVENOUS at 12:35

## 2025-08-28 RX ADMIN — Medication 50 MCG: at 13:26

## 2025-08-28 RX ADMIN — Medication 4 MCG: at 12:52

## 2025-08-28 RX ADMIN — Medication 4 MCG: at 12:43

## 2025-08-28 RX ADMIN — Medication 50 MCG: at 13:07

## 2025-08-28 RX ADMIN — Medication 50 MCG: at 13:31

## 2025-08-28 RX ADMIN — Medication 50 MCG: at 13:11

## 2025-08-28 RX ADMIN — Medication 50 MCG: at 13:38

## 2025-08-28 RX ADMIN — CEFAZOLIN SODIUM 2 G: 2 INJECTION, SOLUTION INTRAVENOUS at 12:45

## 2025-08-28 RX ADMIN — PROPOFOL 75 MCG/KG/MIN: 10 INJECTION, EMULSION INTRAVENOUS at 12:44

## 2025-08-28 RX ADMIN — Medication 4 MCG: at 12:58

## 2025-08-28 RX ADMIN — Medication 50 MCG: at 13:46

## 2025-08-28 RX ADMIN — MIDAZOLAM 2 MG: 1 INJECTION INTRAMUSCULAR; INTRAVENOUS at 12:35

## 2025-08-28 RX ADMIN — Medication 50 MCG: at 13:59

## 2025-08-28 RX ADMIN — PROPOFOL 30 MG: 10 INJECTION, EMULSION INTRAVENOUS at 12:43

## 2025-08-28 RX ADMIN — ONDANSETRON 4 MG: 2 INJECTION, SOLUTION INTRAMUSCULAR; INTRAVENOUS at 12:45

## 2025-08-28 RX ADMIN — HYDROMORPHONE HYDROCHLORIDE 0.5 MG: 1 INJECTION, SOLUTION INTRAMUSCULAR; INTRAVENOUS; SUBCUTANEOUS at 14:49

## 2025-08-28 RX ADMIN — Medication 50 MCG: at 13:14

## 2025-08-28 RX ADMIN — DEXAMETHASONE SODIUM PHOSPHATE 10 MG: 4 INJECTION, SOLUTION INTRAMUSCULAR; INTRAVENOUS at 13:21

## 2025-08-28 RX ADMIN — LIDOCAINE HYDROCHLORIDE 50 MG: 20 INJECTION, SOLUTION EPIDURAL; INFILTRATION; INTRACAUDAL; PERINEURAL at 12:43

## 2025-08-28 SDOH — HEALTH STABILITY: MENTAL HEALTH: CURRENT SMOKER: 0

## 2025-08-28 ASSESSMENT — PAIN - FUNCTIONAL ASSESSMENT
PAIN_FUNCTIONAL_ASSESSMENT: 0-10

## 2025-08-28 ASSESSMENT — PAIN SCALES - GENERAL
PAINLEVEL_OUTOF10: 5 - MODERATE PAIN
PAINLEVEL_OUTOF10: 6
PAINLEVEL_OUTOF10: 4
PAINLEVEL_OUTOF10: 7
PAINLEVEL_OUTOF10: 0 - NO PAIN
PAINLEVEL_OUTOF10: 0 - NO PAIN

## 2025-08-28 ASSESSMENT — PAIN DESCRIPTION - DESCRIPTORS: DESCRIPTORS: ACHING;DISCOMFORT

## 2025-08-28 ASSESSMENT — ENCOUNTER SYMPTOMS
EYES NEGATIVE: 1
GASTROINTESTINAL NEGATIVE: 1
PSYCHIATRIC NEGATIVE: 1
NEUROLOGICAL NEGATIVE: 1
CONSTITUTIONAL NEGATIVE: 1
ENDOCRINE NEGATIVE: 1
BACK PAIN: 1
RESPIRATORY NEGATIVE: 1
CARDIOVASCULAR NEGATIVE: 1
HEMATOLOGIC/LYMPHATIC NEGATIVE: 1

## 2025-08-28 ASSESSMENT — PAIN DESCRIPTION - LOCATION: LOCATION: BACK

## 2025-08-29 ENCOUNTER — TELEPHONE (OUTPATIENT)
Dept: PAIN MEDICINE | Facility: CLINIC | Age: 70
End: 2025-08-29
Payer: MEDICARE

## (undated) DEVICE — ADHESIVE, SKIN, DERMABOND ADVANCED, 15CM, PEN-STYLE

## (undated) DEVICE — SOLUTION, IRRIGATION, STERILE WATER, 1000 ML, POUR BOTTLE

## (undated) DEVICE — DRESSING, TRANSPARENT, TEGADERM, 4 X 4-3/4 IN

## (undated) DEVICE — STRIP, SKIN CLOSURE, STERI STRIP, REINFORCED, 0.5 X 4 IN

## (undated) DEVICE — SPONGE GAUZE, XRAY RFD, 8X4 12 PLY

## (undated) DEVICE — GLOVE, SURGICAL, PROTEXIS PI BLUE W/NEUTHERA, 7.5, PF, LF

## (undated) DEVICE — DRAPE, C-ARM IMAGE

## (undated) DEVICE — DRESSING, TRANSPARENT, TEGADERM, 2-3/8 X 2-3/4 IN

## (undated) DEVICE — SYRINGE, LUER LOCK, 12ML

## (undated) DEVICE — COUNTER, NEEDLE, 1315 MAGNATIC/ FOAM, W/ BOXLOCKS

## (undated) DEVICE — DRESSING, GAUZE, QUIKCLOT, 2X2, 6PLY

## (undated) DEVICE — INTRACEPT, ACCESS INSTRUMENT 2VB, GEN3

## (undated) DEVICE — DRAPE, C-ARMOR KIT

## (undated) DEVICE — Device

## (undated) DEVICE — NEEDLE, HYPODERMIC, MONOJECT, 25 G X 1.5 IN, LUER LOCK HUB, RED

## (undated) DEVICE — APPLICATOR, CHLORAPREP, W/ORANGE TINT, 26ML

## (undated) DEVICE — INTRACEPT, ADDITIONAL ACCESS INSTRUMENT

## (undated) DEVICE — ADHESIVE, SKIN, MASTISOL, 2/3 CC VIAL

## (undated) DEVICE — INTRACEPT RF PROBE

## (undated) DEVICE — DRAPE PACK, BASIC VI, AURORA, 50 X 90IN

## (undated) DEVICE — GLOVE, SURGICAL, PROTEXIS PI , 7.0, PF, LF

## (undated) DEVICE — DRESSING, GAUZE, SPONGE, VERSALON, 4 PLY, 2 X 2 IN, STERILE

## (undated) DEVICE — DRAPE, SHEET, LAPAROTOMY

## (undated) DEVICE — NEEDLE, SPINAL, LONG, 22 G X 5 IN, BLACK HUB